# Patient Record
Sex: FEMALE | Race: WHITE | NOT HISPANIC OR LATINO | Employment: OTHER | URBAN - METROPOLITAN AREA
[De-identification: names, ages, dates, MRNs, and addresses within clinical notes are randomized per-mention and may not be internally consistent; named-entity substitution may affect disease eponyms.]

---

## 2022-11-09 ENCOUNTER — HOSPITAL ENCOUNTER (INPATIENT)
Facility: MEDICAL CENTER | Age: 67
LOS: 5 days | DRG: 481 | End: 2022-11-14
Attending: STUDENT IN AN ORGANIZED HEALTH CARE EDUCATION/TRAINING PROGRAM | Admitting: STUDENT IN AN ORGANIZED HEALTH CARE EDUCATION/TRAINING PROGRAM
Payer: MEDICARE

## 2022-11-09 ENCOUNTER — HOSPITAL ENCOUNTER (INPATIENT)
Facility: MEDICAL CENTER | Age: 67
LOS: 1 days | DRG: 481 | End: 2022-11-09
Attending: HOSPITALIST | Admitting: HOSPITALIST
Payer: MEDICARE

## 2022-11-09 ENCOUNTER — HOSPITAL ENCOUNTER (OUTPATIENT)
Dept: RADIOLOGY | Facility: MEDICAL CENTER | Age: 67
End: 2022-11-09

## 2022-11-09 VITALS
WEIGHT: 179.9 LBS | TEMPERATURE: 97.7 F | RESPIRATION RATE: 19 BRPM | BODY MASS INDEX: 29.94 KG/M2 | HEART RATE: 87 BPM | SYSTOLIC BLOOD PRESSURE: 108 MMHG | OXYGEN SATURATION: 92 % | DIASTOLIC BLOOD PRESSURE: 64 MMHG

## 2022-11-09 DIAGNOSIS — K70.30 ALCOHOLIC CIRRHOSIS, UNSPECIFIED WHETHER ASCITES PRESENT (HCC): ICD-10-CM

## 2022-11-09 DIAGNOSIS — S72.141A INTERTROCHANTERIC FRACTURE OF RIGHT HIP, CLOSED, INITIAL ENCOUNTER (HCC): ICD-10-CM

## 2022-11-09 PROBLEM — S72.001A HIP FRACTURE, RIGHT, CLOSED, INITIAL ENCOUNTER (HCC): Status: ACTIVE | Noted: 2022-11-09

## 2022-11-09 PROBLEM — F10.20 ALCOHOL DEPENDENCE (HCC): Status: ACTIVE | Noted: 2022-11-09
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
## 2022-11-09 PROBLEM — M25.551 RIGHT HIP PAIN: Status: ACTIVE | Noted: 2022-11-09

## 2022-11-09 PROBLEM — I10 HYPERTENSION: Status: ACTIVE | Noted: 2022-11-09

## 2022-11-09 PROCEDURE — A9270 NON-COVERED ITEM OR SERVICE: HCPCS | Performed by: STUDENT IN AN ORGANIZED HEALTH CARE EDUCATION/TRAINING PROGRAM

## 2022-11-09 PROCEDURE — 700101 HCHG RX REV CODE 250: Performed by: STUDENT IN AN ORGANIZED HEALTH CARE EDUCATION/TRAINING PROGRAM

## 2022-11-09 PROCEDURE — 700105 HCHG RX REV CODE 258: Performed by: STUDENT IN AN ORGANIZED HEALTH CARE EDUCATION/TRAINING PROGRAM

## 2022-11-09 PROCEDURE — 770001 HCHG ROOM/CARE - MED/SURG/GYN PRIV*

## 2022-11-09 PROCEDURE — 700111 HCHG RX REV CODE 636 W/ 250 OVERRIDE (IP): Performed by: STUDENT IN AN ORGANIZED HEALTH CARE EDUCATION/TRAINING PROGRAM

## 2022-11-09 PROCEDURE — 99222 1ST HOSP IP/OBS MODERATE 55: CPT | Mod: AI | Performed by: STUDENT IN AN ORGANIZED HEALTH CARE EDUCATION/TRAINING PROGRAM

## 2022-11-09 PROCEDURE — 99221 1ST HOSP IP/OBS SF/LOW 40: CPT | Performed by: STUDENT IN AN ORGANIZED HEALTH CARE EDUCATION/TRAINING PROGRAM

## 2022-11-09 PROCEDURE — 700102 HCHG RX REV CODE 250 W/ 637 OVERRIDE(OP): Performed by: STUDENT IN AN ORGANIZED HEALTH CARE EDUCATION/TRAINING PROGRAM

## 2022-11-09 RX ORDER — LIDOCAINE 50 MG/G
1 PATCH TOPICAL EVERY 24 HOURS
Status: DISCONTINUED | OUTPATIENT
Start: 2022-11-09 | End: 2022-11-09 | Stop reason: HOSPADM

## 2022-11-09 RX ORDER — LIDOCAINE 50 MG/G
1 PATCH TOPICAL EVERY 24 HOURS
Status: DISCONTINUED | OUTPATIENT
Start: 2022-11-09 | End: 2022-11-14 | Stop reason: HOSPADM

## 2022-11-09 RX ORDER — CELECOXIB 200 MG/1
200 CAPSULE ORAL 2 TIMES DAILY
Status: DISCONTINUED | OUTPATIENT
Start: 2022-11-09 | End: 2022-11-10

## 2022-11-09 RX ORDER — GUAIFENESIN/DEXTROMETHORPHAN 100-10MG/5
10 SYRUP ORAL EVERY 6 HOURS PRN
Status: DISCONTINUED | OUTPATIENT
Start: 2022-11-09 | End: 2022-11-09 | Stop reason: HOSPADM

## 2022-11-09 RX ORDER — PROCHLORPERAZINE EDISYLATE 5 MG/ML
5-10 INJECTION INTRAMUSCULAR; INTRAVENOUS EVERY 4 HOURS PRN
Status: DISCONTINUED | OUTPATIENT
Start: 2022-11-09 | End: 2022-11-09 | Stop reason: HOSPADM

## 2022-11-09 RX ORDER — AMOXICILLIN 250 MG
2 CAPSULE ORAL 2 TIMES DAILY
Status: DISCONTINUED | OUTPATIENT
Start: 2022-11-09 | End: 2022-11-10

## 2022-11-09 RX ORDER — CYCLOBENZAPRINE HCL 10 MG
10 TABLET ORAL 3 TIMES DAILY PRN
Status: DISCONTINUED | OUTPATIENT
Start: 2022-11-09 | End: 2022-11-14 | Stop reason: HOSPADM

## 2022-11-09 RX ORDER — ONDANSETRON 4 MG/1
4 TABLET, ORALLY DISINTEGRATING ORAL EVERY 4 HOURS PRN
Status: DISCONTINUED | OUTPATIENT
Start: 2022-11-09 | End: 2022-11-14 | Stop reason: HOSPADM

## 2022-11-09 RX ORDER — OXYCODONE HYDROCHLORIDE 5 MG/1
5 TABLET ORAL ONCE
Status: COMPLETED | OUTPATIENT
Start: 2022-11-09 | End: 2022-11-09

## 2022-11-09 RX ORDER — ACETAMINOPHEN 325 MG/1
650 TABLET ORAL EVERY 6 HOURS PRN
Status: DISCONTINUED | OUTPATIENT
Start: 2022-11-15 | End: 2022-11-10

## 2022-11-09 RX ORDER — ATENOLOL 50 MG/1
50 TABLET ORAL DAILY
Status: ON HOLD | COMMUNITY
End: 2022-11-14

## 2022-11-09 RX ORDER — OXYCODONE HYDROCHLORIDE 5 MG/1
5 TABLET ORAL
Status: DISCONTINUED | OUTPATIENT
Start: 2022-11-09 | End: 2022-11-09 | Stop reason: HOSPADM

## 2022-11-09 RX ORDER — OXYCODONE HYDROCHLORIDE 10 MG/1
10 TABLET ORAL
Status: DISCONTINUED | OUTPATIENT
Start: 2022-11-09 | End: 2022-11-09 | Stop reason: HOSPADM

## 2022-11-09 RX ORDER — LABETALOL HYDROCHLORIDE 5 MG/ML
10 INJECTION, SOLUTION INTRAVENOUS EVERY 4 HOURS PRN
Status: DISCONTINUED | OUTPATIENT
Start: 2022-11-09 | End: 2022-11-14 | Stop reason: HOSPADM

## 2022-11-09 RX ORDER — GUAIFENESIN/DEXTROMETHORPHAN 100-10MG/5
10 SYRUP ORAL EVERY 6 HOURS PRN
Status: DISCONTINUED | OUTPATIENT
Start: 2022-11-09 | End: 2022-11-14 | Stop reason: HOSPADM

## 2022-11-09 RX ORDER — SODIUM CHLORIDE 9 MG/ML
INJECTION, SOLUTION INTRAVENOUS CONTINUOUS
Status: DISCONTINUED | OUTPATIENT
Start: 2022-11-09 | End: 2022-11-09 | Stop reason: HOSPADM

## 2022-11-09 RX ORDER — ACETAMINOPHEN 325 MG/1
650 TABLET ORAL EVERY 6 HOURS
Status: DISCONTINUED | OUTPATIENT
Start: 2022-11-10 | End: 2022-11-10

## 2022-11-09 RX ORDER — OMEPRAZOLE 20 MG/1
20 CAPSULE, DELAYED RELEASE ORAL DAILY
Status: DISCONTINUED | OUTPATIENT
Start: 2022-11-10 | End: 2022-11-09 | Stop reason: HOSPADM

## 2022-11-09 RX ORDER — POLYETHYLENE GLYCOL 3350 17 G/17G
1 POWDER, FOR SOLUTION ORAL
Status: DISCONTINUED | OUTPATIENT
Start: 2022-11-09 | End: 2022-11-10

## 2022-11-09 RX ORDER — ENOXAPARIN SODIUM 100 MG/ML
40 INJECTION SUBCUTANEOUS DAILY
Status: DISCONTINUED | OUTPATIENT
Start: 2022-11-10 | End: 2022-11-10

## 2022-11-09 RX ORDER — OXYCODONE HYDROCHLORIDE 5 MG/1
5 TABLET ORAL
Status: DISCONTINUED | OUTPATIENT
Start: 2022-11-09 | End: 2022-11-10

## 2022-11-09 RX ORDER — AMOXICILLIN 250 MG
2 CAPSULE ORAL 2 TIMES DAILY
Status: DISCONTINUED | OUTPATIENT
Start: 2022-11-09 | End: 2022-11-09 | Stop reason: HOSPADM

## 2022-11-09 RX ORDER — FUROSEMIDE 40 MG/1
40 TABLET ORAL DAILY
Status: ON HOLD | COMMUNITY
End: 2022-11-14

## 2022-11-09 RX ORDER — ACETAMINOPHEN 325 MG/1
650 TABLET ORAL EVERY 6 HOURS PRN
Status: DISCONTINUED | OUTPATIENT
Start: 2022-11-09 | End: 2022-11-09

## 2022-11-09 RX ORDER — SODIUM CHLORIDE 9 MG/ML
INJECTION, SOLUTION INTRAVENOUS CONTINUOUS
Status: DISCONTINUED | OUTPATIENT
Start: 2022-11-09 | End: 2022-11-10

## 2022-11-09 RX ORDER — ACETAMINOPHEN 325 MG/1
650 TABLET ORAL EVERY 6 HOURS PRN
Status: DISCONTINUED | OUTPATIENT
Start: 2022-11-15 | End: 2022-11-09 | Stop reason: HOSPADM

## 2022-11-09 RX ORDER — PROMETHAZINE HYDROCHLORIDE 25 MG/1
12.5-25 TABLET ORAL EVERY 4 HOURS PRN
Status: DISCONTINUED | OUTPATIENT
Start: 2022-11-09 | End: 2022-11-09 | Stop reason: HOSPADM

## 2022-11-09 RX ORDER — OXYCODONE HYDROCHLORIDE 5 MG/1
5 TABLET ORAL ONCE
Status: DISCONTINUED | OUTPATIENT
Start: 2022-11-09 | End: 2022-11-09 | Stop reason: HOSPADM

## 2022-11-09 RX ORDER — POLYETHYLENE GLYCOL 3350 17 G/17G
1 POWDER, FOR SOLUTION ORAL
Status: DISCONTINUED | OUTPATIENT
Start: 2022-11-09 | End: 2022-11-09 | Stop reason: HOSPADM

## 2022-11-09 RX ORDER — ACETAMINOPHEN 325 MG/1
650 TABLET ORAL EVERY 6 HOURS
Status: DISCONTINUED | OUTPATIENT
Start: 2022-11-10 | End: 2022-11-09 | Stop reason: HOSPADM

## 2022-11-09 RX ORDER — FOLIC ACID 1 MG/1
1 TABLET ORAL DAILY
Status: DISCONTINUED | OUTPATIENT
Start: 2022-11-10 | End: 2022-11-09 | Stop reason: HOSPADM

## 2022-11-09 RX ORDER — ZOLPIDEM TARTRATE 5 MG/1
5 TABLET ORAL NIGHTLY PRN
Status: DISCONTINUED | OUTPATIENT
Start: 2022-11-09 | End: 2022-11-09 | Stop reason: HOSPADM

## 2022-11-09 RX ORDER — ZOLPIDEM TARTRATE 5 MG/1
5 TABLET ORAL NIGHTLY PRN
Status: DISCONTINUED | OUTPATIENT
Start: 2022-11-09 | End: 2022-11-14 | Stop reason: HOSPADM

## 2022-11-09 RX ORDER — CELECOXIB 200 MG/1
200 CAPSULE ORAL 2 TIMES DAILY
Status: DISCONTINUED | OUTPATIENT
Start: 2022-11-09 | End: 2022-11-09 | Stop reason: HOSPADM

## 2022-11-09 RX ORDER — BISACODYL 10 MG
10 SUPPOSITORY, RECTAL RECTAL
Status: DISCONTINUED | OUTPATIENT
Start: 2022-11-09 | End: 2022-11-09 | Stop reason: HOSPADM

## 2022-11-09 RX ORDER — OXYCODONE HYDROCHLORIDE 10 MG/1
10 TABLET ORAL
Status: DISCONTINUED | OUTPATIENT
Start: 2022-11-09 | End: 2022-11-10

## 2022-11-09 RX ORDER — HYDROMORPHONE HYDROCHLORIDE 1 MG/ML
0.5 INJECTION, SOLUTION INTRAMUSCULAR; INTRAVENOUS; SUBCUTANEOUS ONCE
Status: COMPLETED | OUTPATIENT
Start: 2022-11-09 | End: 2022-11-09

## 2022-11-09 RX ORDER — CELECOXIB 200 MG/1
200 CAPSULE ORAL 2 TIMES DAILY PRN
Status: DISCONTINUED | OUTPATIENT
Start: 2022-11-14 | End: 2022-11-10

## 2022-11-09 RX ORDER — ENOXAPARIN SODIUM 100 MG/ML
40 INJECTION SUBCUTANEOUS DAILY
Status: DISCONTINUED | OUTPATIENT
Start: 2022-11-10 | End: 2022-11-09 | Stop reason: HOSPADM

## 2022-11-09 RX ORDER — OMEPRAZOLE 20 MG/1
20 CAPSULE, DELAYED RELEASE ORAL DAILY
Status: DISCONTINUED | OUTPATIENT
Start: 2022-11-10 | End: 2022-11-14 | Stop reason: HOSPADM

## 2022-11-09 RX ORDER — HYDROMORPHONE HYDROCHLORIDE 1 MG/ML
0.5 INJECTION, SOLUTION INTRAMUSCULAR; INTRAVENOUS; SUBCUTANEOUS ONCE
Status: DISCONTINUED | OUTPATIENT
Start: 2022-11-09 | End: 2022-11-09 | Stop reason: HOSPADM

## 2022-11-09 RX ORDER — LABETALOL HYDROCHLORIDE 5 MG/ML
10 INJECTION, SOLUTION INTRAVENOUS EVERY 4 HOURS PRN
Status: DISCONTINUED | OUTPATIENT
Start: 2022-11-09 | End: 2022-11-09 | Stop reason: HOSPADM

## 2022-11-09 RX ORDER — CELECOXIB 200 MG/1
200 CAPSULE ORAL 2 TIMES DAILY PRN
Status: DISCONTINUED | OUTPATIENT
Start: 2022-11-14 | End: 2022-11-09 | Stop reason: HOSPADM

## 2022-11-09 RX ORDER — ONDANSETRON 4 MG/1
4 TABLET, ORALLY DISINTEGRATING ORAL EVERY 4 HOURS PRN
Status: DISCONTINUED | OUTPATIENT
Start: 2022-11-09 | End: 2022-11-09 | Stop reason: HOSPADM

## 2022-11-09 RX ORDER — ONDANSETRON 2 MG/ML
4 INJECTION INTRAMUSCULAR; INTRAVENOUS EVERY 4 HOURS PRN
Status: DISCONTINUED | OUTPATIENT
Start: 2022-11-09 | End: 2022-11-09 | Stop reason: HOSPADM

## 2022-11-09 RX ORDER — ONDANSETRON 2 MG/ML
4 INJECTION INTRAMUSCULAR; INTRAVENOUS EVERY 4 HOURS PRN
Status: DISCONTINUED | OUTPATIENT
Start: 2022-11-09 | End: 2022-11-10

## 2022-11-09 RX ORDER — BISACODYL 10 MG
10 SUPPOSITORY, RECTAL RECTAL
Status: DISCONTINUED | OUTPATIENT
Start: 2022-11-09 | End: 2022-11-10

## 2022-11-09 RX ORDER — GAUZE BANDAGE 2" X 2"
100 BANDAGE TOPICAL DAILY
Status: DISCONTINUED | OUTPATIENT
Start: 2022-11-10 | End: 2022-11-09 | Stop reason: HOSPADM

## 2022-11-09 RX ORDER — PROMETHAZINE HYDROCHLORIDE 25 MG/1
12.5-25 SUPPOSITORY RECTAL EVERY 4 HOURS PRN
Status: DISCONTINUED | OUTPATIENT
Start: 2022-11-09 | End: 2022-11-09 | Stop reason: HOSPADM

## 2022-11-09 RX ORDER — HYDROMORPHONE HYDROCHLORIDE 1 MG/ML
0.5 INJECTION, SOLUTION INTRAMUSCULAR; INTRAVENOUS; SUBCUTANEOUS
Status: DISCONTINUED | OUTPATIENT
Start: 2022-11-09 | End: 2022-11-10

## 2022-11-09 RX ORDER — SPIRONOLACTONE 100 MG/1
100 TABLET, FILM COATED ORAL DAILY
Status: ON HOLD | COMMUNITY
End: 2022-11-14

## 2022-11-09 RX ORDER — HYDROMORPHONE HYDROCHLORIDE 1 MG/ML
0.5 INJECTION, SOLUTION INTRAMUSCULAR; INTRAVENOUS; SUBCUTANEOUS
Status: DISCONTINUED | OUTPATIENT
Start: 2022-11-09 | End: 2022-11-09 | Stop reason: HOSPADM

## 2022-11-09 RX ADMIN — CYCLOBENZAPRINE 10 MG: 10 TABLET, FILM COATED ORAL at 23:44

## 2022-11-09 RX ADMIN — SODIUM CHLORIDE: 9 INJECTION, SOLUTION INTRAVENOUS at 23:29

## 2022-11-09 RX ADMIN — OXYCODONE 5 MG: 5 TABLET ORAL at 23:04

## 2022-11-09 RX ADMIN — HYDROMORPHONE HYDROCHLORIDE 0.5 MG: 1 INJECTION, SOLUTION INTRAMUSCULAR; INTRAVENOUS; SUBCUTANEOUS at 23:04

## 2022-11-09 RX ADMIN — CELECOXIB 200 MG: 200 CAPSULE ORAL at 23:04

## 2022-11-09 RX ADMIN — ACETAMINOPHEN 650 MG: 325 TABLET, FILM COATED ORAL at 23:44

## 2022-11-09 RX ADMIN — SENNOSIDES AND DOCUSATE SODIUM 2 TABLET: 50; 8.6 TABLET ORAL at 23:04

## 2022-11-09 RX ADMIN — LIDOCAINE 1 PATCH: 50 PATCH CUTANEOUS at 23:05

## 2022-11-09 ASSESSMENT — PAIN DESCRIPTION - PAIN TYPE: TYPE: ACUTE PAIN

## 2022-11-10 ENCOUNTER — APPOINTMENT (OUTPATIENT)
Dept: RADIOLOGY | Facility: MEDICAL CENTER | Age: 67
DRG: 481 | End: 2022-11-10
Attending: ORTHOPAEDIC SURGERY
Payer: MEDICARE

## 2022-11-10 ENCOUNTER — ANESTHESIA EVENT (OUTPATIENT)
Dept: SURGERY | Facility: MEDICAL CENTER | Age: 67
DRG: 481 | End: 2022-11-10
Payer: MEDICARE

## 2022-11-10 ENCOUNTER — ANESTHESIA (OUTPATIENT)
Dept: SURGERY | Facility: MEDICAL CENTER | Age: 67
DRG: 481 | End: 2022-11-10
Payer: MEDICARE

## 2022-11-10 LAB
ABO + RH BLD: NORMAL
ABO GROUP BLD: NORMAL
ALBUMIN SERPL BCP-MCNC: 3.6 G/DL (ref 3.2–4.9)
ALBUMIN/GLOB SERPL: 1 G/DL
ALP SERPL-CCNC: 124 U/L (ref 30–99)
ALT SERPL-CCNC: 24 U/L (ref 2–50)
ANION GAP SERPL CALC-SCNC: 12 MMOL/L (ref 7–16)
AST SERPL-CCNC: 55 U/L (ref 12–45)
BASOPHILS # BLD AUTO: 0.8 % (ref 0–1.8)
BASOPHILS # BLD: 0.04 K/UL (ref 0–0.12)
BILIRUB SERPL-MCNC: 3.3 MG/DL (ref 0.1–1.5)
BLD GP AB SCN SERPL QL: NORMAL
BUN SERPL-MCNC: 33 MG/DL (ref 8–22)
CALCIUM SERPL-MCNC: 9.4 MG/DL (ref 8.5–10.5)
CHLORIDE SERPL-SCNC: 101 MMOL/L (ref 96–112)
CO2 SERPL-SCNC: 21 MMOL/L (ref 20–33)
CREAT SERPL-MCNC: 1.19 MG/DL (ref 0.5–1.4)
EKG IMPRESSION: NORMAL
EOSINOPHIL # BLD AUTO: 0.03 K/UL (ref 0–0.51)
EOSINOPHIL NFR BLD: 0.6 % (ref 0–6.9)
ERYTHROCYTE [DISTWIDTH] IN BLOOD BY AUTOMATED COUNT: 50.4 FL (ref 35.9–50)
GFR SERPLBLD CREATININE-BSD FMLA CKD-EPI: 50 ML/MIN/1.73 M 2
GLOBULIN SER CALC-MCNC: 3.7 G/DL (ref 1.9–3.5)
GLUCOSE SERPL-MCNC: 119 MG/DL (ref 65–99)
HCT VFR BLD AUTO: 36.8 % (ref 37–47)
HGB BLD-MCNC: 12.7 G/DL (ref 12–16)
IMM GRANULOCYTES # BLD AUTO: 0.04 K/UL (ref 0–0.11)
IMM GRANULOCYTES NFR BLD AUTO: 0.8 % (ref 0–0.9)
INR PPP: 1.08 (ref 0.87–1.13)
LYMPHOCYTES # BLD AUTO: 0.53 K/UL (ref 1–4.8)
LYMPHOCYTES NFR BLD: 10.5 % (ref 22–41)
MAGNESIUM SERPL-MCNC: 1.4 MG/DL (ref 1.5–2.5)
MCH RBC QN AUTO: 37.2 PG (ref 27–33)
MCHC RBC AUTO-ENTMCNC: 34.5 G/DL (ref 33.6–35)
MCV RBC AUTO: 107.9 FL (ref 81.4–97.8)
MONOCYTES # BLD AUTO: 0.8 K/UL (ref 0–0.85)
MONOCYTES NFR BLD AUTO: 15.8 % (ref 0–13.4)
NEUTROPHILS # BLD AUTO: 3.61 K/UL (ref 2–7.15)
NEUTROPHILS NFR BLD: 71.5 % (ref 44–72)
NRBC # BLD AUTO: 0 K/UL
NRBC BLD-RTO: 0 /100 WBC
PHOSPHATE SERPL-MCNC: 3.8 MG/DL (ref 2.5–4.5)
PLATELET # BLD AUTO: 61 K/UL (ref 164–446)
PMV BLD AUTO: 10.2 FL (ref 9–12.9)
POTASSIUM SERPL-SCNC: 5 MMOL/L (ref 3.6–5.5)
PROT SERPL-MCNC: 7.3 G/DL (ref 6–8.2)
PROTHROMBIN TIME: 13.9 SEC (ref 12–14.6)
RBC # BLD AUTO: 3.41 M/UL (ref 4.2–5.4)
RH BLD: NORMAL
SODIUM SERPL-SCNC: 134 MMOL/L (ref 135–145)
WBC # BLD AUTO: 5.1 K/UL (ref 4.8–10.8)

## 2022-11-10 PROCEDURE — 700105 HCHG RX REV CODE 258: Performed by: INTERNAL MEDICINE

## 2022-11-10 PROCEDURE — 700111 HCHG RX REV CODE 636 W/ 250 OVERRIDE (IP): Performed by: ORTHOPAEDIC SURGERY

## 2022-11-10 PROCEDURE — 700101 HCHG RX REV CODE 250: Performed by: STUDENT IN AN ORGANIZED HEALTH CARE EDUCATION/TRAINING PROGRAM

## 2022-11-10 PROCEDURE — 160048 HCHG OR STATISTICAL LEVEL 1-5: Performed by: ORTHOPAEDIC SURGERY

## 2022-11-10 PROCEDURE — 160035 HCHG PACU - 1ST 60 MINS PHASE I: Performed by: ORTHOPAEDIC SURGERY

## 2022-11-10 PROCEDURE — 160029 HCHG SURGERY MINUTES - 1ST 30 MINS LEVEL 4: Performed by: ORTHOPAEDIC SURGERY

## 2022-11-10 PROCEDURE — A9270 NON-COVERED ITEM OR SERVICE: HCPCS | Performed by: STUDENT IN AN ORGANIZED HEALTH CARE EDUCATION/TRAINING PROGRAM

## 2022-11-10 PROCEDURE — 83735 ASSAY OF MAGNESIUM: CPT

## 2022-11-10 PROCEDURE — 85610 PROTHROMBIN TIME: CPT

## 2022-11-10 PROCEDURE — 700105 HCHG RX REV CODE 258: Performed by: STUDENT IN AN ORGANIZED HEALTH CARE EDUCATION/TRAINING PROGRAM

## 2022-11-10 PROCEDURE — C1713 ANCHOR/SCREW BN/BN,TIS/BN: HCPCS | Performed by: ORTHOPAEDIC SURGERY

## 2022-11-10 PROCEDURE — 86900 BLOOD TYPING SEROLOGIC ABO: CPT

## 2022-11-10 PROCEDURE — 27245 TREAT THIGH FRACTURE: CPT | Mod: RT | Performed by: ORTHOPAEDIC SURGERY

## 2022-11-10 PROCEDURE — 160002 HCHG RECOVERY MINUTES (STAT): Performed by: ORTHOPAEDIC SURGERY

## 2022-11-10 PROCEDURE — A9270 NON-COVERED ITEM OR SERVICE: HCPCS | Performed by: ORTHOPAEDIC SURGERY

## 2022-11-10 PROCEDURE — 80053 COMPREHEN METABOLIC PANEL: CPT

## 2022-11-10 PROCEDURE — 86850 RBC ANTIBODY SCREEN: CPT

## 2022-11-10 PROCEDURE — 86901 BLOOD TYPING SEROLOGIC RH(D): CPT

## 2022-11-10 PROCEDURE — 99223 1ST HOSP IP/OBS HIGH 75: CPT | Mod: 57 | Performed by: ORTHOPAEDIC SURGERY

## 2022-11-10 PROCEDURE — 700105 HCHG RX REV CODE 258: Performed by: ORTHOPAEDIC SURGERY

## 2022-11-10 PROCEDURE — 93005 ELECTROCARDIOGRAM TRACING: CPT | Performed by: STUDENT IN AN ORGANIZED HEALTH CARE EDUCATION/TRAINING PROGRAM

## 2022-11-10 PROCEDURE — 99232 SBSQ HOSP IP/OBS MODERATE 35: CPT | Performed by: INTERNAL MEDICINE

## 2022-11-10 PROCEDURE — 770001 HCHG ROOM/CARE - MED/SURG/GYN PRIV*

## 2022-11-10 PROCEDURE — 01210 ANES OPEN PX HIP JOINT NOS: CPT | Performed by: STUDENT IN AN ORGANIZED HEALTH CARE EDUCATION/TRAINING PROGRAM

## 2022-11-10 PROCEDURE — 160041 HCHG SURGERY MINUTES - EA ADDL 1 MIN LEVEL 4: Performed by: ORTHOPAEDIC SURGERY

## 2022-11-10 PROCEDURE — 93010 ELECTROCARDIOGRAM REPORT: CPT | Performed by: INTERNAL MEDICINE

## 2022-11-10 PROCEDURE — 700102 HCHG RX REV CODE 250 W/ 637 OVERRIDE(OP): Performed by: ORTHOPAEDIC SURGERY

## 2022-11-10 PROCEDURE — 160009 HCHG ANES TIME/MIN: Performed by: ORTHOPAEDIC SURGERY

## 2022-11-10 PROCEDURE — 0QS636Z REPOSITION RIGHT UPPER FEMUR WITH INTRAMEDULLARY INTERNAL FIXATION DEVICE, PERCUTANEOUS APPROACH: ICD-10-PCS | Performed by: ORTHOPAEDIC SURGERY

## 2022-11-10 PROCEDURE — 36415 COLL VENOUS BLD VENIPUNCTURE: CPT

## 2022-11-10 PROCEDURE — 73502 X-RAY EXAM HIP UNI 2-3 VIEWS: CPT | Mod: RT

## 2022-11-10 PROCEDURE — 700111 HCHG RX REV CODE 636 W/ 250 OVERRIDE (IP): Performed by: STUDENT IN AN ORGANIZED HEALTH CARE EDUCATION/TRAINING PROGRAM

## 2022-11-10 PROCEDURE — 700102 HCHG RX REV CODE 250 W/ 637 OVERRIDE(OP): Performed by: STUDENT IN AN ORGANIZED HEALTH CARE EDUCATION/TRAINING PROGRAM

## 2022-11-10 PROCEDURE — 85025 COMPLETE CBC W/AUTO DIFF WBC: CPT

## 2022-11-10 PROCEDURE — 84100 ASSAY OF PHOSPHORUS: CPT

## 2022-11-10 DEVICE — SCREW LAG 10.5MM X 95MM (4TX2=8): Type: IMPLANTABLE DEVICE | Site: HIP | Status: FUNCTIONAL

## 2022-11-10 DEVICE — NAIL HIP 127.5 DEGREE 10MM X 180MM (4TX2=8): Type: IMPLANTABLE DEVICE | Site: HIP | Status: FUNCTIONAL

## 2022-11-10 DEVICE — SCREW CROSS LOCK 5MM X 32.5MM (4TX5=20): Type: IMPLANTABLE DEVICE | Site: HIP | Status: FUNCTIONAL

## 2022-11-10 RX ORDER — DOCUSATE SODIUM 100 MG/1
100 CAPSULE, LIQUID FILLED ORAL 2 TIMES DAILY
Status: DISCONTINUED | OUTPATIENT
Start: 2022-11-10 | End: 2022-11-14 | Stop reason: HOSPADM

## 2022-11-10 RX ORDER — SODIUM CHLORIDE, SODIUM LACTATE, POTASSIUM CHLORIDE, CALCIUM CHLORIDE 600; 310; 30; 20 MG/100ML; MG/100ML; MG/100ML; MG/100ML
INJECTION, SOLUTION INTRAVENOUS CONTINUOUS
Status: DISCONTINUED | OUTPATIENT
Start: 2022-11-10 | End: 2022-11-10 | Stop reason: HOSPADM

## 2022-11-10 RX ORDER — POLYETHYLENE GLYCOL 3350 17 G/17G
1 POWDER, FOR SOLUTION ORAL 2 TIMES DAILY PRN
Status: DISCONTINUED | OUTPATIENT
Start: 2022-11-10 | End: 2022-11-14 | Stop reason: HOSPADM

## 2022-11-10 RX ORDER — HALOPERIDOL 5 MG/ML
1 INJECTION INTRAMUSCULAR
Status: DISCONTINUED | OUTPATIENT
Start: 2022-11-10 | End: 2022-11-10 | Stop reason: HOSPADM

## 2022-11-10 RX ORDER — ACETAMINOPHEN 325 MG/1
650 TABLET ORAL EVERY 6 HOURS
Status: DISCONTINUED | OUTPATIENT
Start: 2022-11-10 | End: 2022-11-14 | Stop reason: HOSPADM

## 2022-11-10 RX ORDER — PHENYLEPHRINE HCL IN 0.9% NACL 0.5 MG/5ML
SYRINGE (ML) INTRAVENOUS PRN
Status: DISCONTINUED | OUTPATIENT
Start: 2022-11-10 | End: 2022-11-10 | Stop reason: SURG

## 2022-11-10 RX ORDER — DIPHENHYDRAMINE HYDROCHLORIDE 50 MG/ML
25 INJECTION INTRAMUSCULAR; INTRAVENOUS EVERY 6 HOURS PRN
Status: DISCONTINUED | OUTPATIENT
Start: 2022-11-10 | End: 2022-11-14 | Stop reason: HOSPADM

## 2022-11-10 RX ORDER — ACETAMINOPHEN 325 MG/1
650 TABLET ORAL EVERY 6 HOURS PRN
Status: DISCONTINUED | OUTPATIENT
Start: 2022-11-15 | End: 2022-11-14 | Stop reason: HOSPADM

## 2022-11-10 RX ORDER — OXYCODONE HYDROCHLORIDE 5 MG/1
5 TABLET ORAL
Status: DISCONTINUED | OUTPATIENT
Start: 2022-11-10 | End: 2022-11-14 | Stop reason: HOSPADM

## 2022-11-10 RX ORDER — IBUPROFEN 800 MG/1
800 TABLET ORAL 3 TIMES DAILY PRN
Status: DISCONTINUED | OUTPATIENT
Start: 2022-11-13 | End: 2022-11-11

## 2022-11-10 RX ORDER — ONDANSETRON 2 MG/ML
INJECTION INTRAMUSCULAR; INTRAVENOUS PRN
Status: DISCONTINUED | OUTPATIENT
Start: 2022-11-10 | End: 2022-11-10 | Stop reason: SURG

## 2022-11-10 RX ORDER — OXYCODONE HYDROCHLORIDE 10 MG/1
10 TABLET ORAL
Status: DISCONTINUED | OUTPATIENT
Start: 2022-11-10 | End: 2022-11-14 | Stop reason: HOSPADM

## 2022-11-10 RX ORDER — HYDROMORPHONE HYDROCHLORIDE 1 MG/ML
0.4 INJECTION, SOLUTION INTRAMUSCULAR; INTRAVENOUS; SUBCUTANEOUS
Status: DISCONTINUED | OUTPATIENT
Start: 2022-11-10 | End: 2022-11-10 | Stop reason: HOSPADM

## 2022-11-10 RX ORDER — SODIUM CHLORIDE, SODIUM LACTATE, POTASSIUM CHLORIDE, CALCIUM CHLORIDE 600; 310; 30; 20 MG/100ML; MG/100ML; MG/100ML; MG/100ML
INJECTION, SOLUTION INTRAVENOUS
Status: DISCONTINUED | OUTPATIENT
Start: 2022-11-10 | End: 2022-11-10 | Stop reason: SURG

## 2022-11-10 RX ORDER — MIDAZOLAM HYDROCHLORIDE 1 MG/ML
INJECTION INTRAMUSCULAR; INTRAVENOUS PRN
Status: DISCONTINUED | OUTPATIENT
Start: 2022-11-10 | End: 2022-11-10 | Stop reason: SURG

## 2022-11-10 RX ORDER — ENOXAPARIN SODIUM 100 MG/ML
40 INJECTION SUBCUTANEOUS
Status: DISCONTINUED | OUTPATIENT
Start: 2022-11-10 | End: 2022-11-14 | Stop reason: HOSPADM

## 2022-11-10 RX ORDER — SCOLOPAMINE TRANSDERMAL SYSTEM 1 MG/1
1 PATCH, EXTENDED RELEASE TRANSDERMAL
Status: DISCONTINUED | OUTPATIENT
Start: 2022-11-10 | End: 2022-11-14 | Stop reason: HOSPADM

## 2022-11-10 RX ORDER — DEXAMETHASONE SODIUM PHOSPHATE 4 MG/ML
INJECTION, SOLUTION INTRA-ARTICULAR; INTRALESIONAL; INTRAMUSCULAR; INTRAVENOUS; SOFT TISSUE PRN
Status: DISCONTINUED | OUTPATIENT
Start: 2022-11-10 | End: 2022-11-10 | Stop reason: SURG

## 2022-11-10 RX ORDER — HALOPERIDOL 5 MG/ML
1 INJECTION INTRAMUSCULAR EVERY 6 HOURS PRN
Status: DISCONTINUED | OUTPATIENT
Start: 2022-11-10 | End: 2022-11-14 | Stop reason: HOSPADM

## 2022-11-10 RX ORDER — ONDANSETRON 2 MG/ML
4 INJECTION INTRAMUSCULAR; INTRAVENOUS EVERY 4 HOURS PRN
Status: DISCONTINUED | OUTPATIENT
Start: 2022-11-10 | End: 2022-11-14 | Stop reason: HOSPADM

## 2022-11-10 RX ORDER — HYDROMORPHONE HYDROCHLORIDE 1 MG/ML
0.1 INJECTION, SOLUTION INTRAMUSCULAR; INTRAVENOUS; SUBCUTANEOUS
Status: DISCONTINUED | OUTPATIENT
Start: 2022-11-10 | End: 2022-11-10 | Stop reason: HOSPADM

## 2022-11-10 RX ORDER — CEFAZOLIN SODIUM 1 G/3ML
INJECTION, POWDER, FOR SOLUTION INTRAMUSCULAR; INTRAVENOUS PRN
Status: DISCONTINUED | OUTPATIENT
Start: 2022-11-10 | End: 2022-11-10 | Stop reason: SURG

## 2022-11-10 RX ORDER — HYDRALAZINE HYDROCHLORIDE 20 MG/ML
5 INJECTION INTRAMUSCULAR; INTRAVENOUS
Status: DISCONTINUED | OUTPATIENT
Start: 2022-11-10 | End: 2022-11-10 | Stop reason: HOSPADM

## 2022-11-10 RX ORDER — BISACODYL 10 MG
10 SUPPOSITORY, RECTAL RECTAL
Status: DISCONTINUED | OUTPATIENT
Start: 2022-11-10 | End: 2022-11-14 | Stop reason: HOSPADM

## 2022-11-10 RX ORDER — DIPHENHYDRAMINE HYDROCHLORIDE 50 MG/ML
12.5 INJECTION INTRAMUSCULAR; INTRAVENOUS
Status: DISCONTINUED | OUTPATIENT
Start: 2022-11-10 | End: 2022-11-10 | Stop reason: HOSPADM

## 2022-11-10 RX ORDER — ROCURONIUM BROMIDE 10 MG/ML
INJECTION, SOLUTION INTRAVENOUS PRN
Status: DISCONTINUED | OUTPATIENT
Start: 2022-11-10 | End: 2022-11-10 | Stop reason: SURG

## 2022-11-10 RX ORDER — ONDANSETRON 2 MG/ML
4 INJECTION INTRAMUSCULAR; INTRAVENOUS
Status: DISCONTINUED | OUTPATIENT
Start: 2022-11-10 | End: 2022-11-10 | Stop reason: HOSPADM

## 2022-11-10 RX ORDER — DEXAMETHASONE SODIUM PHOSPHATE 4 MG/ML
4 INJECTION, SOLUTION INTRA-ARTICULAR; INTRALESIONAL; INTRAMUSCULAR; INTRAVENOUS; SOFT TISSUE
Status: DISCONTINUED | OUTPATIENT
Start: 2022-11-10 | End: 2022-11-14 | Stop reason: HOSPADM

## 2022-11-10 RX ORDER — AMOXICILLIN 250 MG
1 CAPSULE ORAL
Status: DISCONTINUED | OUTPATIENT
Start: 2022-11-10 | End: 2022-11-14 | Stop reason: HOSPADM

## 2022-11-10 RX ORDER — HYDROMORPHONE HYDROCHLORIDE 1 MG/ML
0.2 INJECTION, SOLUTION INTRAMUSCULAR; INTRAVENOUS; SUBCUTANEOUS
Status: DISCONTINUED | OUTPATIENT
Start: 2022-11-10 | End: 2022-11-10 | Stop reason: HOSPADM

## 2022-11-10 RX ORDER — HYDROMORPHONE HYDROCHLORIDE 1 MG/ML
0.5 INJECTION, SOLUTION INTRAMUSCULAR; INTRAVENOUS; SUBCUTANEOUS
Status: DISCONTINUED | OUTPATIENT
Start: 2022-11-10 | End: 2022-11-14 | Stop reason: HOSPADM

## 2022-11-10 RX ORDER — AMOXICILLIN 250 MG
1 CAPSULE ORAL NIGHTLY
Status: DISCONTINUED | OUTPATIENT
Start: 2022-11-10 | End: 2022-11-14 | Stop reason: HOSPADM

## 2022-11-10 RX ORDER — ENEMA 19; 7 G/133ML; G/133ML
1 ENEMA RECTAL
Status: DISCONTINUED | OUTPATIENT
Start: 2022-11-10 | End: 2022-11-14 | Stop reason: HOSPADM

## 2022-11-10 RX ORDER — LABETALOL HYDROCHLORIDE 5 MG/ML
5 INJECTION, SOLUTION INTRAVENOUS
Status: DISCONTINUED | OUTPATIENT
Start: 2022-11-10 | End: 2022-11-10 | Stop reason: HOSPADM

## 2022-11-10 RX ORDER — OXYCODONE HCL 5 MG/5 ML
5 SOLUTION, ORAL ORAL
Status: COMPLETED | OUTPATIENT
Start: 2022-11-10 | End: 2022-11-10

## 2022-11-10 RX ORDER — SODIUM CHLORIDE 9 MG/ML
INJECTION, SOLUTION INTRAVENOUS CONTINUOUS
Status: DISCONTINUED | OUTPATIENT
Start: 2022-11-10 | End: 2022-11-11

## 2022-11-10 RX ORDER — LIDOCAINE HYDROCHLORIDE 40 MG/ML
SOLUTION TOPICAL PRN
Status: DISCONTINUED | OUTPATIENT
Start: 2022-11-10 | End: 2022-11-10 | Stop reason: SURG

## 2022-11-10 RX ORDER — KETOROLAC TROMETHAMINE 30 MG/ML
15 INJECTION, SOLUTION INTRAMUSCULAR; INTRAVENOUS EVERY 6 HOURS
Status: DISCONTINUED | OUTPATIENT
Start: 2022-11-10 | End: 2022-11-11

## 2022-11-10 RX ORDER — OXYCODONE HCL 5 MG/5 ML
10 SOLUTION, ORAL ORAL
Status: COMPLETED | OUTPATIENT
Start: 2022-11-10 | End: 2022-11-10

## 2022-11-10 RX ADMIN — ACETAMINOPHEN 650 MG: 325 TABLET, FILM COATED ORAL at 16:10

## 2022-11-10 RX ADMIN — KETOROLAC TROMETHAMINE 15 MG: 30 INJECTION, SOLUTION INTRAMUSCULAR; INTRAVENOUS at 14:26

## 2022-11-10 RX ADMIN — DEXAMETHASONE SODIUM PHOSPHATE 8 MG: 4 INJECTION, SOLUTION INTRA-ARTICULAR; INTRALESIONAL; INTRAMUSCULAR; INTRAVENOUS; SOFT TISSUE at 07:25

## 2022-11-10 RX ADMIN — CEFAZOLIN 2 G: 2 INJECTION, POWDER, FOR SOLUTION INTRAMUSCULAR; INTRAVENOUS at 11:39

## 2022-11-10 RX ADMIN — FENTANYL CITRATE 50 MCG: 50 INJECTION, SOLUTION INTRAMUSCULAR; INTRAVENOUS at 07:16

## 2022-11-10 RX ADMIN — SUGAMMADEX 200 MG: 100 INJECTION, SOLUTION INTRAVENOUS at 07:38

## 2022-11-10 RX ADMIN — DOCUSATE SODIUM 100 MG: 100 CAPSULE, LIQUID FILLED ORAL at 17:30

## 2022-11-10 RX ADMIN — SODIUM CHLORIDE 1000 ML: 9 INJECTION, SOLUTION INTRAVENOUS at 23:21

## 2022-11-10 RX ADMIN — LIDOCAINE 1 PATCH: 50 PATCH CUTANEOUS at 23:15

## 2022-11-10 RX ADMIN — SODIUM CHLORIDE: 9 INJECTION, SOLUTION INTRAVENOUS at 13:36

## 2022-11-10 RX ADMIN — Medication 200 MCG: at 07:22

## 2022-11-10 RX ADMIN — SODIUM CHLORIDE, POTASSIUM CHLORIDE, SODIUM LACTATE AND CALCIUM CHLORIDE: 600; 310; 30; 20 INJECTION, SOLUTION INTRAVENOUS at 07:11

## 2022-11-10 RX ADMIN — OXYCODONE HYDROCHLORIDE 5 MG: 5 SOLUTION ORAL at 08:08

## 2022-11-10 RX ADMIN — OXYCODONE 5 MG: 5 TABLET ORAL at 16:10

## 2022-11-10 RX ADMIN — CEFAZOLIN 2 G: 330 INJECTION, POWDER, FOR SOLUTION INTRAMUSCULAR; INTRAVENOUS at 07:16

## 2022-11-10 RX ADMIN — SENNOSIDES AND DOCUSATE SODIUM 1 TABLET: 50; 8.6 TABLET ORAL at 21:13

## 2022-11-10 RX ADMIN — ENOXAPARIN SODIUM 40 MG: 40 INJECTION SUBCUTANEOUS at 21:13

## 2022-11-10 RX ADMIN — ROCURONIUM BROMIDE 40 MG: 10 INJECTION, SOLUTION INTRAVENOUS at 07:17

## 2022-11-10 RX ADMIN — ACETAMINOPHEN 650 MG: 325 TABLET, FILM COATED ORAL at 23:12

## 2022-11-10 RX ADMIN — PROPOFOL 60 MG: 10 INJECTION, EMULSION INTRAVENOUS at 07:16

## 2022-11-10 RX ADMIN — ACETAMINOPHEN 650 MG: 325 TABLET, FILM COATED ORAL at 11:22

## 2022-11-10 RX ADMIN — KETOROLAC TROMETHAMINE 15 MG: 30 INJECTION, SOLUTION INTRAMUSCULAR; INTRAVENOUS at 09:52

## 2022-11-10 RX ADMIN — EPHEDRINE SULFATE 10 MG: 50 INJECTION, SOLUTION INTRAVENOUS at 07:27

## 2022-11-10 RX ADMIN — MIDAZOLAM HYDROCHLORIDE 2 MG: 1 INJECTION, SOLUTION INTRAMUSCULAR; INTRAVENOUS at 07:05

## 2022-11-10 RX ADMIN — KETOROLAC TROMETHAMINE 15 MG: 30 INJECTION, SOLUTION INTRAMUSCULAR; INTRAVENOUS at 21:13

## 2022-11-10 RX ADMIN — ONDANSETRON 4 MG: 2 INJECTION INTRAMUSCULAR; INTRAVENOUS at 07:38

## 2022-11-10 RX ADMIN — LIDOCAINE HYDROCHLORIDE 4 ML: 40 SOLUTION TOPICAL at 07:17

## 2022-11-10 ASSESSMENT — PAIN DESCRIPTION - PAIN TYPE
TYPE: SURGICAL PAIN
TYPE: SURGICAL PAIN

## 2022-11-10 ASSESSMENT — PAIN SCALES - GENERAL: PAIN_LEVEL: 2

## 2022-11-10 NOTE — HOSPITAL COURSE
This is a 61 y.o. female with history of hypertension who presented 11/9/2022 to [facility] ED with fall, found to have right hip fracture on x-ray, transferred to [facility] as direct admit for orthopedic evaluation.  Patient reported having mechanical fall earlier today as she was trying to help her  who is dependent on home oxygen due to COPD.  She reported having slipped/tripped and fell on her right side, followed by immediate pain in her right hip and unable to ambulate. Patient found to have a right hip fracture. Ortho consulted and patient admitted for further treatment     This is a 61-year-old female with a past medical significant for hypertension, ethanol abuse, complicated with liver cirrhosis presented in [facility] on 11/9/2022 after she had a ground-level fall.  Imaging showed right hip fracture, transferred to [facility] for orthopedic evaluation    Orthopedic surgery  was consulted, patient underwent Surgical treatment of right intertrochanteric femur fracture with intramedullary device on 11/10/2022.   Per orthopedic surgery, patient can bear weight as tolerated. PT/OT has evaluated patient, recommended home with home health.  Hospital course was complicated with pancytopenia likely secondary to toxic effect of ethanol abuse.  Patient is noted to have thrombocytopenia likely secondary to liver cirrhosis.  Adverse effect of drinking alcohol has been explained to the patient today, she stated understanding.    During this stay in the hospital, patient did not tell me how much alcohol does she drink,  she was started on CIWA protocol, she will be discharged on p.o. thiamine folic acid.    I discussed with the patient that I am not able to give her any DVT prophylaxis he needs to walk at home.  I also explained to her that the risk of DVT considering she is not on any DVT prophylaxis considering patient severe thrombocytopenia

## 2022-11-10 NOTE — OR NURSING
PACU note- Respirations easy.  Three tegaderms and gauze in place to right lateral upper leg, blood on underneath layer of top and bottom dressings, has not increased during PACU stay.  Ice pack in place.  Palpable pedal pulses, patient can wiggle her toes, they are pink with brisk capillary refill.  Patient very sleepy, arouses easily, oral med given.  Report to floor and wife updated.

## 2022-11-10 NOTE — CARE PLAN
Problem: Knowledge Deficit - Standard  Goal: Patient and family/care givers will demonstrate understanding of plan of care, disease process/condition, diagnostic tests and medications  Outcome: Progressing  Note: POC discussed with pt at bedside. Pt asks appropriate questions, no additional concerns at this time.      Problem: Pain - Standard  Goal: Alleviation of pain or a reduction in pain to the patient’s comfort goal  Outcome: Progressing  Note: Pt states pain 10/10. Medications administered per MAR, ice pack applied, pillows for comfort and repositioning.    The patient is Watcher - Medium risk of patient condition declining or worsening    Shift Goals  Clinical Goals: surgery, pain control, NPO midnight  Patient Goals: pain control  Family Goals: communication, pain control    Progress made toward(s) clinical / shift goals:        Patient is not progressing towards the following goals:

## 2022-11-10 NOTE — PROGRESS NOTES
[facility] Medicine Daily Progress Note    Date of Service  11/10/2022    Chief Complaint  Rosalva Hunter is a 66 y.o. female admitted 11/9/2022 with right hip pain after GLF     Hospital Course  This is a 61 y.o. female with history of hypertension who presented 11/9/2022 to [facility] ED with fall, found to have right hip fracture on x-ray, transferred to [facility] as direct admit for orthopedic evaluation.  Patient reported having mechanical fall earlier today as she was trying to help her  who is dependent on home oxygen due to COPD.  She reported having slipped/tripped and fell on her right side, followed by immediate pain in her right hip and unable to ambulate. Patient found to have a right hip fracture. Ortho consulted and patient admitted for further treatment     Interval Problem Update  Patient seen and examined, afebrile, had surgical repair with ortho this morning, afebrile   Pain management   PT/OT mariaa     I have discussed this patient's plan of care and discharge plan at IDT rounds today with Case Management, Nursing, Nursing leadership, and other members of the IDT team.    Consultants/Specialty  orthopedics    Code Status  Full Code    Disposition  Patient is not medically cleared for discharge.   Anticipate discharge to  TBD  .  I have placed the appropriate orders for post-discharge needs.    Review of Systems  ROS     Physical Exam  Temp:  [36.1 °C (97 °F)-36.6 °C (97.9 °F)] 36.3 °C (97.3 °F)  Pulse:  [7-78] 73  Resp:  [12-18] 17  BP: ()/(55-61) 92/57  SpO2:  [91 %-99 %] 99 %    Physical Exam    Fluids    Intake/Output Summary (Last 24 hours) at 11/10/2022 1243  Last data filed at 11/10/2022 0830  Gross per 24 hour   Intake 996.84 ml   Output 425 ml   Net 571.84 ml       Laboratory  Recent Labs     11/10/22  0438   WBC 5.1   RBC 3.41*   HEMOGLOBIN 12.7   HEMATOCRIT 36.8*   .9*   MCH 37.2*   MCHC 34.5   RDW 50.4*   PLATELETCT 61*   MPV 10.2     Recent Labs      11/10/22  0438   SODIUM 134*   POTASSIUM 5.0   CHLORIDE 101   CO2 21   GLUCOSE 119*   BUN 33*   CREATININE 1.19   CALCIUM 9.4     Recent Labs     11/10/22  0438   INR 1.08               Imaging  DX-PORTABLE FLUOROSCOPY < 1 HOUR Is the patient pregnant? Unknown   Final Result      Portable fluoroscopy utilized for 20 seconds.         INTERPRETING LOCATION: [address] [place] [address] [place], [id number]      DX-HIP-UNILATERAL-W/O PELVIS-2/3 VIEWS RIGHT   Final Result      Portable fluoroscopy as described.      [id number] FILM X-RAY   Final Result           Assessment/Plan  * Hip fracture, right, closed, initial encounter (HCC)  Assessment & Plan  S/p GLF  Fracture noted on x-ray  Supportive pain control  Ortho consulted and surgical repair today   PT/OT after the OR  Orthopedic following appreciate rec       Intertrochanteric fracture of right hip, closed, initial encounter (HCC)- (present on admission)  Assessment & Plan  As above    Fall  Assessment & Plan  Fall precautions    Hypertension  Assessment & Plan  Holding BP meds for now as BP on the low side     Alcohol dependence ([facility])  Assessment & Plan  Detox bag, MV    Right hip pain  Assessment & Plan  As above       VTE prophylaxis: enoxaparin ppx    I have performed a physical exam and reviewed and updated ROS and Plan today (11/10/2022). In review of yesterday's note (11/9/2022), there are no changes except as documented above.

## 2022-11-10 NOTE — H&P
This version of the note has been redacted during the course of a chart correction case. If you need access to the original text of this version of the note, please contact the Health Information Management department at [phone].

## 2022-11-10 NOTE — ANESTHESIA PREPROCEDURE EVALUATION
Case: [id number] Date/Time: 11/10/22 0745    Procedure: INSERTION, INTRAMEDULLARY VIKI, FEMUR, PROXIMAL (Right)    Location: [facility] OR 16 / SURGERY [facility]    Surgeons: Gregory Vu M.D.        67 yo woman s/p mechanical fall.  HTN  Plt 61 (11/10)    Drinks wine daily. Unable to quantify.     Never had surgery or anesthesia before. No family hx of anesthesia complications.    Denies Mi, CVA, SOB/CP on exertion or at rest. METS>4. NPO >8hrs.     Relevant Problems   CARDIAC   (positive) Hypertension       Physical Exam    Airway   Mallampati: II  TM distance: >3 FB  Neck ROM: full       Cardiovascular - normal exam  Rhythm: regular  Rate: normal  (-) murmur     Dental - normal exam      Very poor dentition   Pulmonary - normal exam  Breath sounds clear to auscultation     Abdominal    Neurological - normal exam         Other findings: No loose teeth             Anesthesia Plan    ASA 2       Plan - general       Airway plan will be ETT          Induction: intravenous    Postoperative Plan: Postoperative administration of opioids is intended.    Pertinent diagnostic labs and testing reviewed    Informed Consent:    Anesthetic plan and risks discussed with patient.    Use of blood products discussed with: patient whom consented to blood products.

## 2022-11-10 NOTE — CARE PLAN
Problem: Knowledge Deficit - Standard  Goal: Patient and family/care givers will demonstrate understanding of plan of care, disease process/condition, diagnostic tests and medications    Outcome: Progressing  Note: POC discussed with the patient. PT and OT Eval. Questions answered. Verbalized understanding.     Problem: Pain - Standard  Goal: Alleviation of pain or a reduction in pain to the patient’s comfort goal  utcome: Progressing  Note: Educated on pain scale. Encouraged to verbalize pain. Ice pack provided prn. Will medicate as per MAR. Verbalized understanding.        Problem: Fall Risk  Goal: Patient will remain free from falls    Outcome: Progressing  Note: Fall protocol in effect. Non skid socks in use. Call light within reach. Reminded patient to call for assist. Hourly rounds in effect. Bed alarm placed. Bed in lowest position. Kept room clutter free. Verbalized understanding.     The patient is Stable - Low risk of patient condition declining or worsening    Shift Goals  Clinical Goals: pain control, safety, mobility  Patient Goals: pain control, comfort  Family Goals: no family member present    Progress made toward(s) clinical / shift goals:      Patient is not progressing towards the following goals:

## 2022-11-10 NOTE — H&P
Hospital Medicine History & Physical Note    Date of Service  11/9/2022    Primary Care Physician  No primary care provider on file.    Consultants  Orthopedic (Dr. Vu)    Code Status  Full Code    Chief Complaint  No chief complaint on file.  Right hip pain, GLF    History of Presenting Illness  Rosalva Hunter is a 61 y.o. female with history of hypertension who presented 11/9/2022 to [facility] ED with fall, found to have right hip fracture on x-ray, transferred to [facility] as direct admit for orthopedic evaluation.  Patient reported having mechanical fall earlier today as she was trying to help her  who is dependent on home oxygen due to COPD.  She reported having slipped/tripped and fell on her right side, followed by immediate pain in her right hip and unable to ambulate.  Patient was seen by me at [facility] arrival, admitted to medicine service.  No preop labs included -- Labs ordered for tomorrow.  Requested included CD imaging of x-ray hip to be uploaded to epic.  I consulted on-call orthopedic, tentative plan for intervention tomorrow.    I discussed the plan of care with patient, bedside RN, and orthopedics.    Review of Systems  Review of Systems   Constitutional:  Negative for chills and fever.   HENT:  Negative for hearing loss and tinnitus.    Eyes:  Negative for blurred vision and double vision.   Respiratory:  Negative for cough and shortness of breath.    Cardiovascular:  Negative for chest pain and palpitations.   Gastrointestinal:  Negative for heartburn and nausea.   Genitourinary:  Negative for dysuria and flank pain.   Musculoskeletal:  Positive for falls, joint pain and myalgias.   Skin:  Negative for itching and rash.   Neurological:  Negative for dizziness and headaches.   Endo/Heme/Allergies:  Negative for environmental allergies. Does not bruise/bleed easily.   Psychiatric/Behavioral:  Negative for depression and substance abuse.    All other systems reviewed and are  negative.     Past Medical History   has a past medical history of Renal disorder.  Hypertension  Prior pelvis fracture after fall, treated nonsurgically     Surgical History   has no past surgical history on file.   Denies PSH     Family History  family history is not on file.   Family history reviewed with patient. There is no family history that is pertinent to the chief complaint.      Social History   reports that she has never smoked. She has never used smokeless tobacco. She reports current alcohol use. She reports that she does not use drugs.  Denies tobacco use  Denies social EtOH use  Denies illicit drug use    Allergies  None    Medications  None         Physical Exam  Vitals and nursing note reviewed.   Constitutional:       General: She is not in acute distress.  HENT:      Head: Normocephalic and atraumatic.      Nose: Nose normal.      Mouth/Throat:      Mouth: Mucous membranes are moist.      Pharynx: Oropharynx is clear.   Eyes:      General: No scleral icterus.     Extraocular Movements: Extraocular movements intact.   Cardiovascular:      Rate and Rhythm: Normal rate and regular rhythm.      Pulses: Normal pulses.      Heart sounds:     No friction rub.   Pulmonary:      Effort: No respiratory distress.      Breath sounds: No stridor. No wheezing or rales.   Abdominal:      General: There is no distension.      Tenderness: There is no abdominal tenderness. There is no guarding or rebound.   Musculoskeletal:         General: Tenderness and signs of injury present.      Cervical back: Neck supple. No tenderness.      Comments: Tender right hip, decreased ROM   Skin:     General: Skin is warm and dry.      Capillary Refill: Capillary refill takes less than 2 seconds.   Neurological:      General: No focal deficit present.      Mental Status: She is alert and oriented to person, place, and time.   Psychiatric:         Mood and Affect: Mood normal.     Laboratory:          No results for input(s):  ALTSGPT, ASTSGOT, ALKPHOSPHAT, TBILIRUBIN, DBILIRUBIN, GAMMAGT, AMYLASE, LIPASE, ALB, PREALBUMIN, GLUCOSE in the last 72 hours.      No results for input(s): NTPROBNP in the last 72 hours.      No results for input(s): TROPONINT in the last 72 hours.    Imaging:  [id number] FILM X-RAY   Final Result          no X-Ray or EKG requiring interpretation    Assessment/Plan:  Justification for Admission Status  I anticipate this patient will require at least two midnights of hospitalization, therefore appropriate for inpatient status.      * Hip fracture, right, closed, initial encounter ([facility])  Assessment & Plan  S/p GLF  Fracture noted on x-ray  Supportive pain control  PT/OT after the OR  Orthopedic follow-up appreciated      Intertrochanteric fracture of right hip, closed, initial encounter ([facility])- (present on admission)  Assessment & Plan  As above    Right hip pain  Assessment & Plan  As above    Hypertension  Assessment & Plan  Resume once able to verify home med  PRN labetalol    Alcohol dependence ([facility])  Assessment & Plan  Detox bag, MV    Fall  Assessment & Plan  Fall precautions      VTE prophylaxis: enoxaparin ppx

## 2022-11-10 NOTE — PROGRESS NOTES
0715 Report received from CARMEL Beebe RN at the beginning of the shift. Patient's in surgery during shift report.     0826 Report received from JULES StarkU RN.    0846 Received patient from PACU via bed accompanied by one male transport.    0855 Patient's sitting up in bed. Educated on the importance/use of IS at least 10x every hour while awake, able to reach 1250. FC to DD. Fall protocol in effect. Call light within reach. Reminded patient to call for assist. Assessment completed. No distress noted. Plan of care reviewed with the patient. Verbalized understanding.    1033 Notified JOEL Alicia that patient's surgical dressing were saturated, new order received and acknowledged (see nursing communication).    1150 Dressings to right hip changed as per order.    1233 New order received and acknowledged from Dr Jacinto (see MAR) and labs.     1300 Patient's sitting up in bed, having lunch. No distress noted.     1530 Patient's in bed. No distress noted.     1610 Medicated with Oxycodone (see MAR) fr c/o's right hip pain, rates pain 4/10, ice pack given. Daughter visiting.     1730 Patient's sitting up in bed, having Dinner. No distress noted.

## 2022-11-10 NOTE — DISCHARGE PLANNING
Anticipated Discharge Disposition: Unknown    Action: Call from  of pt to VM of ER CM at [facility] upset looking update and status of pt. Called back and transferred call to [facility] . Pt is not in [facility] . No info provided will leave that to care team for her    Barriers to Discharge: IUnknown    Plan: No further [facility] needs

## 2022-11-10 NOTE — H&P
Hospital Medicine History & Physical Note     Date of Service  11/9/2022     Primary Care Physician  Nishi Wang M.D.     Consultants  Orthopedic (Dr. Vu)     Code Status  Full Code     Chief Complaint  No chief complaint on file.  Fall, right hip pain     History of Presenting Illness  Rosalva Hunter is a 61 y.o. female with history of hypertension who presented 11/9/2022 to [facility] ED with fall, found to have right hip fracture on x-ray, transferred to [facility] as direct admit for orthopedic evaluation.  Patient reported having mechanical fall earlier today as she was trying to help her  who is dependent on home oxygen due to COPD.  She reported having slipped/tripped and fell on her right side, followed by immediate pain in her right hip and unable to ambulate.  Patient was seen by me at [facility] arrival, admitted to medicine service.  No preop labs included -- Labs ordered for tomorrow.  Requested included CD imaging of x-ray hip to be uploaded to epic.  I consulted on-call orthopedic, tentative plan for intervention tomorrow.     I discussed the plan of care with patient, bedside RN, and orthopedics.     Review of Systems  Review of Systems   Constitutional:  Negative for chills and fever.   HENT:  Negative for hearing loss and tinnitus.    Eyes:  Negative for blurred vision and double vision.   Respiratory:  Negative for cough and shortness of breath.    Cardiovascular:  Negative for chest pain and palpitations.   Gastrointestinal:  Negative for heartburn and nausea.   Genitourinary:  Negative for dysuria and flank pain.   Musculoskeletal:  Positive for falls, joint pain and myalgias.   Skin:  Negative for itching and rash.   Neurological:  Negative for dizziness and headaches.   Endo/Heme/Allergies:  Negative for environmental allergies. Does not bruise/bleed easily.   Psychiatric/Behavioral:  Negative for depression and substance abuse.    All other systems reviewed and are  negative.     Past Medical History   has a past medical history of Renal disorder.  Hypertension  Prior pelvis fracture after fall, treated nonsurgically     Surgical History   has no past surgical history on file.   Denies PSH     Family History  family history is not on file.   Family history reviewed with patient. There is no family history that is pertinent to the chief complaint.      Social History   reports that she has never smoked. She has never used smokeless tobacco. She reports current alcohol use. She reports that she does not use drugs.  Denies tobacco use  Denies social EtOH use  Denies illicit drug use     Allergies       Allergies   Allergen Reactions    Ciprofloxacin           Medications  Prior to Admission Medications   Prescriptions Last Dose Informant Patient Reported? Taking?   amoxicillin-clavulanate (AUGMENTIN) 875-125 MG TABS     No No   Sig: Take 1 Tab by mouth 2 times a day.   hydrocod polst-chlorphen polst (TUSSIONEX PENNKINETIC ER) 10-8 MG/5ML LQCR     No No   Sig: Take 5 mL by mouth every 12 hours.      Facility-Administered Medications: None         Physical Exam  Temp:  [36.5 °C (97.7 °F)] 36.5 °C (97.7 °F)  Pulse:  [87] 87  Resp:  [19] 19  BP: (108)/(64) 108/64  SpO2:  [92 %] 92 %                            Physical Exam  Vitals and nursing note reviewed.   Constitutional:       General: She is not in acute distress.  HENT:      Head: Normocephalic and atraumatic.      Nose: Nose normal.      Mouth/Throat:      Mouth: Mucous membranes are moist.      Pharynx: Oropharynx is clear.   Eyes:      General: No scleral icterus.     Extraocular Movements: Extraocular movements intact.   Cardiovascular:      Rate and Rhythm: Normal rate and regular rhythm.      Pulses: Normal pulses.      Heart sounds:     No friction rub.   Pulmonary:      Effort: No respiratory distress.      Breath sounds: No stridor. No wheezing or rales.   Abdominal:      General: There is no distension.       Tenderness: There is no abdominal tenderness. There is no guarding or rebound.   Musculoskeletal:         General: Tenderness and signs of injury present.      Cervical back: Neck supple. No tenderness.      Comments: Tender right hip, decreased ROM   Skin:     General: Skin is warm and dry.      Capillary Refill: Capillary refill takes less than 2 seconds.   Neurological:      General: No focal deficit present.      Mental Status: She is alert and oriented to person, place, and time.   Psychiatric:         Mood and Affect: Mood normal.         Laboratory:          No results for input(s): ALTSGPT, ASTSGOT, ALKPHOSPHAT, TBILIRUBIN, DBILIRUBIN, GAMMAGT, AMYLASE, LIPASE, ALB, PREALBUMIN, GLUCOSE in the last 72 hours.      No results for input(s): NTPROBNP in the last 72 hours.      No results for input(s): TROPONINT in the last 72 hours.     Imaging:  No orders to display         no X-Ray or EKG requiring interpretation     Assessment/Plan:  Justification for Admission Status  I anticipate this patient will require at least two midnights hospitalization, therefore appropriate for inpatient status.           * Hip fracture, right, closed, initial encounter ([facility])- (present on admission)  Assessment & Plan  S/p GLF  Fracture noted on x-ray  Supportive pain control  PT/OT after the OR  Orthopedic follow-up appreciated     Right hip pain  Assessment & Plan  As above     Alcohol use  Assessment & Plan  Denies abuse  Detox bag, MV        Hypertension  Assessment & Plan  Resume home med once able to verify  As needed labetalol     Fall  Assessment & Plan  Fall precautions        VTE prophylaxis: enoxaparin ppx

## 2022-11-10 NOTE — CONSULTS
"11/10/2022    The patient was seen at the request of Dr Silva    HPI: Rosalva Hunter is a 66 y.o. female who presents with complaints of pain to right hip.  This started yesterday after fall.  The pain is 6/10 and is described as sharp.  The pain is made worse by palpation of the area and made better by rest and immobilization.    No past medical history on file.    No past surgical history on file.    Medications  No current facility-administered medications on file prior to encounter.     Current Outpatient Medications on File Prior to Encounter   Medication Sig Dispense Refill    furosemide (LASIX) 40 MG Tab Take 1 Tablet by mouth every day. Indications: High Blood Pressure Disorder      spironolactone (ALDACTONE) 100 MG Tab Take 1 Tablet by mouth every day. Indications: High Blood Pressure Disorder      atenolol (TENORMIN) 50 MG Tab Take 1 Tablet by mouth every day. Indications: High Blood Pressure Disorder         Allergies  Patient has no known allergies.    ROS  Right hip pain. All other systems were reviewed and found to be negative    No family history on file.    Social History     Socioeconomic History    Marital status:        Physical Exam  Vitals  /61   Pulse 72   Temp 36.1 °C (97 °F) (Temporal)   Resp 18   Ht 1.676 m (5' 6\")   Wt 64.1 kg (141 lb 5 oz)   SpO2 96%   General: Well Developed, Well Nourished, Age appropriate appearance  HEENT: Normocephalic, atraumatic  Psych: Normal mood and affect  Neck: Supple, nontender, no masses  Lungs: Breathing unlabored, No audible wheezing  Heart: Regular heart rate and rhythm  Abdomen: Soft, NT, ND  Neuro: Sensation grossly intact to BUE and BLE, moving all four extremities  Skin: Intact, no open wounds  Vascular: 2+DP/PT, Capillary refill <2 seconds  MSK: Right hip pain, LLE      Radiographs:  Right IT femur fracture  [id number] FILM X-RAY   Final Result          Laboratory Values  Recent Labs     11/10/22  0438   WBC 5.1   RBC " 3.41*   HEMOGLOBIN 12.7   HEMATOCRIT 36.8*   .9*   MCH 37.2*   MCHC 34.5   RDW 50.4*   PLATELETCT 61*   MPV 10.2     Recent Labs     11/10/22  0438   SODIUM 134*   POTASSIUM 5.0   CHLORIDE 101   CO2 21   GLUCOSE 119*   BUN 33*     Recent Labs     11/10/22  0438   INR 1.08         Impression:Right intertrochanteric femur fracture    Plan:We discussed the diagnosis and findings with the patient at length.  We reviewed possible non operative and operative interventions and the risks and benefits of each of these.  she had a chance to ask questions and all of these were answered to her satisfaction. The patient chose to proceed with  operative intervention. Risks and benefits of surgery were discussed which include but are not limited to bleeding, infection, neurovascular damage, malunion, nonunion, instability, limb length discrepancy, DVT, PE, MI, Stroke and death. They understand these risks and wish to proceed.

## 2022-11-10 NOTE — PROGRESS NOTES
I spoke with Dr. Garcia at [facility] in [place]. Ms. Hunter has a past medical history of controlled hypertension though has no known medical conditions that had the misfortune of a ground-level fall and sustained a right intertrochanteric hip fracture.  They do not have orthopedics and they are critical access hospital.  She does not have any other discernible trauma.  Be transferred here and will be made n.p.o. at midnight and orthopedics will either be called this evening or tomorrow morning.  These call the hospitalist on-call when she gets here.

## 2022-11-10 NOTE — PROGRESS NOTES
4 Eyes Skin Assessment Completed by OMER Ball and OMER Garcia.    Head WDL  Ears WDL  Nose WDL  Mouth WDL  Neck WDL  Breast/Chest rashes on right upper chestm right under breast  Shoulder Blades WDL  Spine Bruising right flank area  (R) Arm/Elbow/Hand Bruising right upper arm and elbow, rash on right AC  (L) Arm/Elbow/Hand WDL  Abdomen rashes on abdomen  Groin rashes on inguinal area  Scrotum/Coccyx/Buttocks Redness and Blanching  (R) Leg Rashes on RLE  (L) Leg Rashes on left upper thigh, bruising on left knee  (R) Heel/Foot/Toe Rash on top of right foot  (L) Heel/Foot/Toe Rash on left foot          Devices In Places Pulse Ox, Melendez, SCD's, and Nasal Cannula      Interventions In Place NC W/Ear Foams, Pillows, and Pressure Redistribution Mattress    Possible Skin Injury No    Pictures Uploaded Into Epic N/A  Wound Consult Placed N/A  RN Wound Prevention Protocol Ordered No

## 2022-11-10 NOTE — ANESTHESIA PROCEDURE NOTES
Airway    Date/Time: 11/10/2022 7:17 AM  Performed by: Ariella Vazquez M.D.  Authorized by: Ariella Vazquez M.D.     Location:  OR  Urgency:  Elective  Indications for Airway Management:  Anesthesia      Spontaneous Ventilation: absent    Sedation Level:  Deep  Preoxygenated: Yes    Patient Position:  Sniffing  Mask Difficulty Assessment:  1 - vent by mask  Final Airway Type:  Endotracheal airway  Final Endotracheal Airway:  ETT  Cuffed: Yes    Technique Used for Successful ETT Placement:  Direct laryngoscopy    Insertion Site:  Oral  Blade Type:  Richardson  Laryngoscope Blade/Videolaryngoscope Blade Size:  2  ETT Size (mm):  7.0  Measured from:  Teeth  ETT to Teeth (cm):  21  Placement Verified by: auscultation and capnometry    Cormack-Lehane Classification:  Grade I - full view of glottis  Number of Attempts at Approach:  1

## 2022-11-10 NOTE — ASSESSMENT & PLAN NOTE
S/p GLF  Fracture noted on x-ray  Supportive pain control  Ortho consulted and surgical repair on 11/10   -- pt and ot recs home with home health   -- Multimodal pain management

## 2022-11-10 NOTE — ANESTHESIA TIME REPORT
Anesthesia Start and Stop Event Times     Date Time Event    11/10/2022 0650 Ready for Procedure     0711 Anesthesia Start     0800 Anesthesia Stop        Responsible Staff  11/10/22    Name Role Begin End    Ariella Vazquez M.D. Anesth 0711 0800        Overtime Reason:  no overtime (within assigned shift)    Comments:

## 2022-11-10 NOTE — OP REPORT
DATE OF OPERATION: 11/10/2022     PREOPERATIVE DIAGNOSIS: Right intertrochanteric femur fracture    POSTOPERATIVE DIAGNOSIS: Same    PROCEDURE PERFORMED: Surgical treatment of right intertrochanteric femur fracture with intramedullary device    SURGEON: Gregory Vu M.D.     ASSISTANT: None    ANESTHESIA: General    ESTIMATED BLOOD LOSS: 50 mL    INDICATIONS: The patient is a 66 y.o. female with a right intertrochanteric femur fracture resulting from a ground level fall.  The patient denies antecedent pain, and was found to have a normal neurovascular exam and skin envelope.  Radiographs reviewed by myself demonstrated the intertrochanteric femur fracture.  Given these findings, surgical treatment of the intertrochanteric fracture with an intramedullary device was indicated.  I discussed the risks and benefits of the procedure, including the risks of infection, wound healing complication, neurovascular injury, persistent hip pain, malunion, non-union, malrotation, and the medical risks of anesthesia including DVT, PE, MI, stroke, and death.  Benefits include early mobilization, improved chance of union, and reduction in the medical risks of hip fractures.  Alternatives to surgery were also discussed, including non-operative management.  The patient signed the informed consent and the operative extremity was marked.      PROCEDURE:  The patient underwent anesthesia, and was positioned supine on the fracture table and all bony prominences were well padded.  Preoperative antibiotics were administered. Sequential compression devices were employed.  Preoperative imaging was obtained, demonstrated reduction of the fracture using the table. The correct operative site was prepped and draped into a sterile field. A procedural pause was conducted to verify correct patient, correct extremity, presence of the surgeons initials on the operative extremity.    The guide pin for the OIC hip nail was placed into the tip of the  greater trochanter and advanced under fluoroscopic guidance to the appropriate position. An incision was made around the pin, and the entry reamer was then used to gain access to the femoral canal.  The guide pin was then removed.    A long ball-tip guide wire was advanced down the femur to the knee, its position confirmed on fluoroscopy.  We then measured for, and selected a 180 x 11 x 127.5 OIC hip nail.  We then sequentially reamed to a size 12.5 mm reamer, and advanced the nail over the guide pin to an appropriate depth, confirmed by fluoroscopy.    The soft tissue sleeve for the lag screw was then advanced down to bone through a lateral thigh incision.  The guide pin was advanced to a central position within the femoral neck/head, confirmed by fluoroscopy.  We measured our lag screw and reamed for our lag screw.  We then placed the lag screw by hand.  The fracture was compressed, and the set screw was locked proximally.  All instruments were removed from the proximal femur, and final fluoroscopic images obtained.    We then obtained perfect [place] imaging distally, and placed one statically interlocked distal screw, obtaining good bicortical purchase.    All wounds were irrigated  with normal saline, and closed in layers, with 2-0 Vicryl in the subcutaneous tissue, and staples in the skin.  Sterile dressings were applied.       The patient tolerated the procedure well. There were no apparent complications. All sponge, needle, and instrument counts were correct on two separate occasions. She was awakened, extubated, and transferred to the recovery room in satisfactory condition.     Post-Operative Plan:    1.  The patient should bear weight as tolerated on their operative extremity.  Gait aids (crutch or crutches, cane, walker) may be used as needed, and may be discontinued when no longer required.  2.  IV antibiotics - may be continued for 24 hours, but are not required.  3.  DVT prophylaxis - SCD's and Lovenox  40 mg SQ daily while inpatient.  The patient may transition to Aspirin 325 mg PO BID as an outpatient  4.  Discharge planning  ____________________________________   Gregory Vu M.D.   DD: 11/10/2022  7:50 AM

## 2022-11-10 NOTE — ANESTHESIA POSTPROCEDURE EVALUATION
Patient: Rosalva Hunter    Procedure Summary     Date: 11/10/22 Room / Location: [place] [address] / SURGERY [facility]    Anesthesia Start: 0711 Anesthesia Stop: 0800    Procedure: INSERTION, INTRAMEDULLARY VIKI, FEMUR, PROXIMAL (Right: Hip) Diagnosis: (right hip frature)    Surgeons: Gregory Vu M.D. Responsible Provider: Ariella Vazquez M.D.    Anesthesia Type: general ASA Status: 2          Final Anesthesia Type: general  Last vitals  BP   Blood Pressure : (!) 90/55    Temp   36.1 °C (97 °F)    Pulse   72   Resp   18    SpO2   91 %      Anesthesia Post Evaluation    Patient location during evaluation: PACU  Patient participation: complete - patient participated  Level of consciousness: sleepy but conscious  Pain score: 2    Airway patency: patent  Anesthetic complications: no  Cardiovascular status: hemodynamically stable  Respiratory status: acceptable and face mask  Hydration status: euvolemic    PONV: none          There were no known notable events for this encounter.     Nurse Pain Score: 10 (NPRS)

## 2022-11-11 PROBLEM — D64.9 ANEMIA: Status: ACTIVE | Noted: 2022-11-11

## 2022-11-11 PROBLEM — D69.6 THROMBOCYTOPENIA (HCC): Status: ACTIVE | Noted: 2022-11-11

## 2022-11-11 LAB
ANION GAP SERPL CALC-SCNC: 8 MMOL/L (ref 7–16)
BUN SERPL-MCNC: 36 MG/DL (ref 8–22)
CALCIUM SERPL-MCNC: 8.6 MG/DL (ref 8.5–10.5)
CHLORIDE SERPL-SCNC: 102 MMOL/L (ref 96–112)
CO2 SERPL-SCNC: 21 MMOL/L (ref 20–33)
CREAT SERPL-MCNC: 1.15 MG/DL (ref 0.5–1.4)
ERYTHROCYTE [DISTWIDTH] IN BLOOD BY AUTOMATED COUNT: 51 FL (ref 35.9–50)
GFR SERPLBLD CREATININE-BSD FMLA CKD-EPI: 53 ML/MIN/1.73 M 2
GLUCOSE SERPL-MCNC: 160 MG/DL (ref 65–99)
HAV IGM SERPL QL IA: NORMAL
HBV CORE IGM SER QL: NORMAL
HBV SURFACE AG SER QL: NORMAL
HCT VFR BLD AUTO: 29.5 % (ref 37–47)
HCV AB SER QL: NORMAL
HGB BLD-MCNC: 9.8 G/DL (ref 12–16)
HIV 1+2 AB+HIV1 P24 AG SERPL QL IA: NORMAL
IRON SATN MFR SERPL: 30 % (ref 15–55)
IRON SERPL-MCNC: 60 UG/DL (ref 40–170)
LDH SERPL L TO P-CCNC: 236 U/L (ref 107–266)
MCH RBC QN AUTO: 37.1 PG (ref 27–33)
MCHC RBC AUTO-ENTMCNC: 33.2 G/DL (ref 33.6–35)
MCV RBC AUTO: 111.7 FL (ref 81.4–97.8)
PLATELET # BLD AUTO: 40 K/UL (ref 164–446)
PMV BLD AUTO: 10.7 FL (ref 9–12.9)
POTASSIUM SERPL-SCNC: 5.5 MMOL/L (ref 3.6–5.5)
RBC # BLD AUTO: 2.64 M/UL (ref 4.2–5.4)
SODIUM SERPL-SCNC: 131 MMOL/L (ref 135–145)
TIBC SERPL-MCNC: 200 UG/DL (ref 250–450)
TSH SERPL DL<=0.005 MIU/L-ACNC: 1.1 UIU/ML (ref 0.38–5.33)
UIBC SERPL-MCNC: 140 UG/DL (ref 110–370)
VIT B12 SERPL-MCNC: 1981 PG/ML (ref 211–911)
WBC # BLD AUTO: 4.8 K/UL (ref 4.8–10.8)

## 2022-11-11 PROCEDURE — 99232 SBSQ HOSP IP/OBS MODERATE 35: CPT | Performed by: HOSPITALIST

## 2022-11-11 PROCEDURE — 770001 HCHG ROOM/CARE - MED/SURG/GYN PRIV*

## 2022-11-11 PROCEDURE — 700102 HCHG RX REV CODE 250 W/ 637 OVERRIDE(OP): Performed by: STUDENT IN AN ORGANIZED HEALTH CARE EDUCATION/TRAINING PROGRAM

## 2022-11-11 PROCEDURE — 80074 ACUTE HEPATITIS PANEL: CPT

## 2022-11-11 PROCEDURE — HZ2ZZZZ DETOXIFICATION SERVICES FOR SUBSTANCE ABUSE TREATMENT: ICD-10-PCS | Performed by: HOSPITALIST

## 2022-11-11 PROCEDURE — A9270 NON-COVERED ITEM OR SERVICE: HCPCS | Performed by: ORTHOPAEDIC SURGERY

## 2022-11-11 PROCEDURE — 97161 PT EVAL LOW COMPLEX 20 MIN: CPT

## 2022-11-11 PROCEDURE — 36415 COLL VENOUS BLD VENIPUNCTURE: CPT

## 2022-11-11 PROCEDURE — 700111 HCHG RX REV CODE 636 W/ 250 OVERRIDE (IP): Performed by: ORTHOPAEDIC SURGERY

## 2022-11-11 PROCEDURE — 83550 IRON BINDING TEST: CPT

## 2022-11-11 PROCEDURE — 700101 HCHG RX REV CODE 250: Performed by: STUDENT IN AN ORGANIZED HEALTH CARE EDUCATION/TRAINING PROGRAM

## 2022-11-11 PROCEDURE — 84443 ASSAY THYROID STIM HORMONE: CPT

## 2022-11-11 PROCEDURE — 700102 HCHG RX REV CODE 250 W/ 637 OVERRIDE(OP): Performed by: ORTHOPAEDIC SURGERY

## 2022-11-11 PROCEDURE — 83540 ASSAY OF IRON: CPT

## 2022-11-11 PROCEDURE — 97166 OT EVAL MOD COMPLEX 45 MIN: CPT

## 2022-11-11 PROCEDURE — A9270 NON-COVERED ITEM OR SERVICE: HCPCS | Performed by: STUDENT IN AN ORGANIZED HEALTH CARE EDUCATION/TRAINING PROGRAM

## 2022-11-11 PROCEDURE — 700105 HCHG RX REV CODE 258: Performed by: INTERNAL MEDICINE

## 2022-11-11 PROCEDURE — 85027 COMPLETE CBC AUTOMATED: CPT

## 2022-11-11 PROCEDURE — 87389 HIV-1 AG W/HIV-1&-2 AB AG IA: CPT

## 2022-11-11 PROCEDURE — 80048 BASIC METABOLIC PNL TOTAL CA: CPT

## 2022-11-11 PROCEDURE — 83615 LACTATE (LD) (LDH) ENZYME: CPT

## 2022-11-11 PROCEDURE — 82607 VITAMIN B-12: CPT

## 2022-11-11 RX ADMIN — ACETAMINOPHEN 650 MG: 325 TABLET, FILM COATED ORAL at 23:14

## 2022-11-11 RX ADMIN — DOCUSATE SODIUM 100 MG: 100 CAPSULE, LIQUID FILLED ORAL at 04:59

## 2022-11-11 RX ADMIN — OMEPRAZOLE 20 MG: 20 CAPSULE, DELAYED RELEASE ORAL at 04:59

## 2022-11-11 RX ADMIN — ACETAMINOPHEN 650 MG: 325 TABLET, FILM COATED ORAL at 16:41

## 2022-11-11 RX ADMIN — ACETAMINOPHEN 650 MG: 325 TABLET, FILM COATED ORAL at 04:59

## 2022-11-11 RX ADMIN — ACETAMINOPHEN 650 MG: 325 TABLET, FILM COATED ORAL at 11:56

## 2022-11-11 RX ADMIN — SENNOSIDES AND DOCUSATE SODIUM 1 TABLET: 50; 8.6 TABLET ORAL at 21:23

## 2022-11-11 RX ADMIN — LIDOCAINE 1 PATCH: 50 PATCH CUTANEOUS at 23:16

## 2022-11-11 RX ADMIN — OXYCODONE 5 MG: 5 TABLET ORAL at 08:35

## 2022-11-11 RX ADMIN — POLYETHYLENE GLYCOL 3350 1 PACKET: 17 POWDER, FOR SOLUTION ORAL at 08:36

## 2022-11-11 RX ADMIN — DOCUSATE SODIUM 100 MG: 100 CAPSULE, LIQUID FILLED ORAL at 16:42

## 2022-11-11 RX ADMIN — KETOROLAC TROMETHAMINE 15 MG: 30 INJECTION, SOLUTION INTRAMUSCULAR; INTRAVENOUS at 02:57

## 2022-11-11 RX ADMIN — SODIUM CHLORIDE: 9 INJECTION, SOLUTION INTRAVENOUS at 12:03

## 2022-11-11 ASSESSMENT — GAIT ASSESSMENTS
DISTANCE (FEET): 200
GAIT LEVEL OF ASSIST: STANDBY ASSIST
DEVIATION: BRADYKINETIC;ANTALGIC
ASSISTIVE DEVICE: FRONT WHEEL WALKER

## 2022-11-11 ASSESSMENT — COGNITIVE AND FUNCTIONAL STATUS - GENERAL
SUGGESTED CMS G CODE MODIFIER MOBILITY: CK
SUGGESTED CMS G CODE MODIFIER DAILY ACTIVITY: CJ
STANDING UP FROM CHAIR USING ARMS: A LITTLE
MOBILITY SCORE: 18
WALKING IN HOSPITAL ROOM: A LITTLE
TURNING FROM BACK TO SIDE WHILE IN FLAT BAD: A LITTLE
DRESSING REGULAR LOWER BODY CLOTHING: A LITTLE
CLIMB 3 TO 5 STEPS WITH RAILING: A LITTLE
HELP NEEDED FOR BATHING: A LITTLE
DAILY ACTIVITIY SCORE: 22
MOVING TO AND FROM BED TO CHAIR: A LITTLE
MOVING FROM LYING ON BACK TO SITTING ON SIDE OF FLAT BED: A LITTLE

## 2022-11-11 ASSESSMENT — PAIN DESCRIPTION - PAIN TYPE
TYPE: SURGICAL PAIN

## 2022-11-11 ASSESSMENT — PATIENT HEALTH QUESTIONNAIRE - PHQ9
2. FEELING DOWN, DEPRESSED, IRRITABLE, OR HOPELESS: NOT AT ALL
SUM OF ALL RESPONSES TO PHQ9 QUESTIONS 1 AND 2: 0
1. LITTLE INTEREST OR PLEASURE IN DOING THINGS: NOT AT ALL

## 2022-11-11 ASSESSMENT — ACTIVITIES OF DAILY LIVING (ADL): TOILETING: INDEPENDENT

## 2022-11-11 NOTE — THERAPY
Occupational Therapy   Initial Evaluation     Patient Name: Rosalva Hunter  Age:  66 y.o., Sex:  female  Medical Record #: [id number]  Today's Date: 11/11/2022    Precautions: (P) Weight Bearing As Tolerated Right Lower Extremity    Assessment  Patient is 66 y.o. female admitted after Glf with R hip pain found to have R hip fx now s/p femur IMN seen for OT evaluation. Pt demonstrated mod A for LB dressing, CGA for toileting, and CGA for standing ADLs. 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 Anticipate Dc home with HHOT services, however will follow to maximize independence prior to DC.    Plan    Recommend Occupational Therapy 4 times per week until therapy goals are met for the following treatments:  Adaptive Equipment, Cognitive Skill Development, Neuro Re-Education / Balance, Self Care/Activities of Daily Living, Therapeutic Activities, and Therapeutic Exercises.    DC Equipment Recommendations: (P) None  Discharge Recommendations: (P) Recommend home health for continued occupational therapy services      11/11/22 1432   Prior Living Situation   Prior Services Home-Independent   Housing / Facility [address]   Bathroom Set up Walk In Shower;Tub Transfer Bench   Equipment Owned Tub Transfer Bench;Grab Bar(s) In Tub / Shower;Grab Bar(s) By Toilet   Lives with - Patient's Self Care Capacity Spouse   Comments Spouse unable to assist, except for driving. They live in a handicap accessible senior home independently.   Prior Level of ADL Function   Self Feeding Independent   Grooming / Hygiene Independent   Bathing Independent   Dressing Independent   Toileting Independent   Prior Level of IADL Function   Medication Management Independent   Laundry Independent   Kitchen Mobility Independent   Finances Independent   Home Management Independent   Shopping Independent   Prior Level Of Mobility Independent Without Device in Community   Precautions    Precautions Weight Bearing As Tolerated Right Lower Extremity   Cognition    Cognition / Consciousness X   Comments agreeable, appeared anxious throughout regarding DC home   Active ROM Upper Body   Active ROM Upper Body  WDL   Strength Upper Body   Upper Body Strength  WDL   Balance Assessment   Sitting Balance (Static) Fair   Sitting Balance (Dynamic) Fair   Standing Balance (Static) Fair   Standing Balance (Dynamic) Fair   Weight Shift Sitting Good   Weight Shift Standing Fair   Comments with FWW   Bed Mobility    Supine to Sit Minimal Assist   Scooting Supervised   ADL Assessment   Grooming Contact Guard Assist;Standing   Lower Body Dressing Supervision   Toileting Contact Guard Assist   Comments pain with bending for LB dressing   How much help from another person does the patient currently need...   6 Clicks Daily Activity Score 22   Functional Mobility   Sit to Stand Contact Guard Assist   Bed, Chair, Wheelchair Transfer Contact Guard Assist   Toilet Transfers Contact Guard Assist   Patient / Family Goals   Patient / Family Goal #1 to go home   Short Term Goals   Short Term Goal # 1 Pt will demo LB dressing SPV   Short Term Goal # 2 Pt will tolerate 5-7 min standing ADLs SPV   Short Term Goal # 3 Pt will demo toileting SPV   Education Group   Education Provided Weight Bearing Precautions   Role of Occupational Therapist Patient Response Patient;Acceptance;Explanation   Weight Bearing Precautions Patient Response Patient;Acceptance;Explanation;Action Demonstration   Problem List   Problem List Decreased Active Daily Living Skills;Decreased Homemaking Skills;Decreased Functional Mobility;Decreased Activity Tolerance;Impaired Postural Control / Balance   Anticipated Discharge Equipment and Recommendations   DC Equipment Recommendations None   Discharge Recommendations Recommend home health for continued occupational therapy services

## 2022-11-11 NOTE — CARE PLAN
Problem: Knowledge Deficit - Standard  Goal: Patient and family/care givers will demonstrate understanding of plan of care, disease process/condition, diagnostic tests and medications  Outcome: Progressing  Note: POC discussed with the patient. PT and OT Eval. Questions answered. Verbalized understanding.     Problem: Pain - Standard  Goal: Alleviation of pain or a reduction in pain to the patient’s comfort goal  Outcome: Progressing  Note: Educated on pain scale. Encouraged to verbalize pain. Will medicate as per MAR.     Problem: Fall Risk  Goal: Patient will remain free from falls  Note: Fall protocol in effect. Non skid socks in use. Call light within reach. Reminded patient to call for assist. Hourly rounds in effect. Kept room clutter free. Verbalized understanding.       The patient is Stable - Low risk of patient condition declining or worsening    Shift Goals  Clinical Goals: pain control, safety, mobility  Patient Goals: pain control, PT and OT Eval  Family Goals: no family member present.    Progress made toward(s) clinical / shift goals:        Patient is not progressing towards the following goals:

## 2022-11-11 NOTE — CARE PLAN
The patient is Stable - Low risk of patient condition declining or worsening    Shift Goals  Clinical Goals: Pain management, mobility, safety  Patient Goals: pain control  Family Goals: not present    Progress made toward(s) clinical / shift goals:       Patient is not progressing towards the following goals:      Problem: Knowledge Deficit - Standard  Goal: Patient and family/care givers will demonstrate understanding of plan of care, disease process/condition, diagnostic tests and medications  Outcome: Progressing     Problem: Pain - Standard  Goal: Alleviation of pain or a reduction in pain to the patient’s comfort goal  Outcome: Progressing     Problem: Fall Risk  Goal: Patient will remain free from falls  Outcome: Progressing     Problem: Skin Integrity  Goal: Skin integrity is maintained or improved  Outcome: Progressing

## 2022-11-11 NOTE — PROGRESS NOTES
0650 Patient's sitting up in bed. Bedside report received from CARMEL Shen RN at the beginning of the shift..     0826 Report received from Mi PACU RN.    0846 Received patient from PACU via bed accompanied by one male transport.    0835 Patient's sitting up in bed. Educated on the importance/use of IS at least 10x every hour while awake, able to reach 1250. FC to DD. Medicated with Oxycodone (see MAR0 for c/o's right hip pain, rates pain 4/10, ice pack given. Fall protocol in effect. Call light within reach. Reminded patient to call for assist. Assessment completed. No distress noted. Plan of care reviewed with the patient. Verbalized understanding.    0930 Patient's in bed. No dsitress noted.    1150 Patient's in bed. No distress noted.      1310 Patient's in bed. No distress noted.    1430 TEODORO Wyman and AMEYA Colón worked with the patient. Ambulated patient in the hallway with FWW.     1445 Melendez catheter removed as per order.    1503 New order received and acknowledged for US abdomen complete.    1550 JOEL Alicia visited. POC discussed with the patient.    1603 New order received and acknowledged from Dr Zaman - labs.     1740 Patient's sitting up in bed, having Dinner. No distress noted.    1850 Patient's in bed. No changes in status. Bedside report given to Jelena BURT RN (Ac).

## 2022-11-11 NOTE — DOCUMENTATION QUERY
[facility]                                                                       Query Response Note      PATIENT:               GREGORIO JONES  ACCT #:                  [id number]  MRN:                     [id number]  :                      1955  ADMIT DATE:       2022 10:32 PM  DISCH DATE:          RESPONDING  PROVIDER #:        [id number]           QUERY TEXT:    Sodium lab values of 134 and 131 are noted in the Medical Record.  Can a diagnosis be specified to support this finding?    The patient's Clinical Indicators include:  Findings:  --Na from 11/10:  134  --Na from :  131    Treatment:  --Labs monitoring  --NS IV 100ml/hr continuous    Risk Factors:  --Right femur fracture s/p surgical repair  --HTN  --ETOH dependence    Thank you,  Aline Mason RN, BSN  Clinical   Connect via [employer]  Options provided:   -- Patient has hyponatremia   -- Patient does not have hyponatremia   -- Other explanation, please specify   -- Unable to determine      Query created by: Aline Mason on 2022 1:12 PM    RESPONSE TEXT:    Patient has hyponatremia          Electronically signed by:  NICHO ADDISON MD 2022 2:56 PM

## 2022-11-12 ENCOUNTER — APPOINTMENT (OUTPATIENT)
Dept: RADIOLOGY | Facility: MEDICAL CENTER | Age: 67
DRG: 481 | End: 2022-11-12
Attending: HOSPITALIST
Payer: MEDICARE

## 2022-11-12 PROBLEM — E27.40 ADRENAL INSUFFICIENCY (HCC): Status: ACTIVE | Noted: 2022-11-12

## 2022-11-12 PROBLEM — N17.9 AKI (ACUTE KIDNEY INJURY) (HCC): Status: ACTIVE | Noted: 2022-11-12

## 2022-11-12 PROBLEM — D61.818 PANCYTOPENIA (HCC): Status: ACTIVE | Noted: 2022-11-12

## 2022-11-12 LAB
ALBUMIN SERPL BCP-MCNC: 2.7 G/DL (ref 3.2–4.9)
AMMONIA PLAS-SCNC: 43 UMOL/L (ref 11–45)
BUN SERPL-MCNC: 44 MG/DL (ref 8–22)
CALCIUM SERPL-MCNC: 8.7 MG/DL (ref 8.5–10.5)
CHLORIDE SERPL-SCNC: 104 MMOL/L (ref 96–112)
CO2 SERPL-SCNC: 20 MMOL/L (ref 20–33)
CORTIS SERPL-MCNC: 0.6 UG/DL (ref 0–23)
CREAT SERPL-MCNC: 1.25 MG/DL (ref 0.5–1.4)
ERYTHROCYTE [DISTWIDTH] IN BLOOD BY AUTOMATED COUNT: 52 FL (ref 35.9–50)
GFR SERPLBLD CREATININE-BSD FMLA CKD-EPI: 48 ML/MIN/1.73 M 2
GLUCOSE SERPL-MCNC: 124 MG/DL (ref 65–99)
HCT VFR BLD AUTO: 29.7 % (ref 37–47)
HGB BLD-MCNC: 10 G/DL (ref 12–16)
MAGNESIUM SERPL-MCNC: 1.5 MG/DL (ref 1.5–2.5)
MCH RBC QN AUTO: 37.5 PG (ref 27–33)
MCHC RBC AUTO-ENTMCNC: 33.7 G/DL (ref 33.6–35)
MCV RBC AUTO: 111.2 FL (ref 81.4–97.8)
PHOSPHATE SERPL-MCNC: 2.5 MG/DL (ref 2.5–4.5)
PHOSPHATE SERPL-MCNC: 2.5 MG/DL (ref 2.5–4.5)
PLATELET # BLD AUTO: 52 K/UL (ref 164–446)
PMV BLD AUTO: 10.6 FL (ref 9–12.9)
POTASSIUM SERPL-SCNC: 5.4 MMOL/L (ref 3.6–5.5)
RBC # BLD AUTO: 2.67 M/UL (ref 4.2–5.4)
SODIUM SERPL-SCNC: 132 MMOL/L (ref 135–145)
WBC # BLD AUTO: 4.5 K/UL (ref 4.8–10.8)

## 2022-11-12 PROCEDURE — 80069 RENAL FUNCTION PANEL: CPT

## 2022-11-12 PROCEDURE — 700102 HCHG RX REV CODE 250 W/ 637 OVERRIDE(OP): Performed by: STUDENT IN AN ORGANIZED HEALTH CARE EDUCATION/TRAINING PROGRAM

## 2022-11-12 PROCEDURE — 82533 TOTAL CORTISOL: CPT

## 2022-11-12 PROCEDURE — 700111 HCHG RX REV CODE 636 W/ 250 OVERRIDE (IP): Performed by: HOSPITALIST

## 2022-11-12 PROCEDURE — 84100 ASSAY OF PHOSPHORUS: CPT

## 2022-11-12 PROCEDURE — 82140 ASSAY OF AMMONIA: CPT

## 2022-11-12 PROCEDURE — 700102 HCHG RX REV CODE 250 W/ 637 OVERRIDE(OP): Performed by: ORTHOPAEDIC SURGERY

## 2022-11-12 PROCEDURE — A9270 NON-COVERED ITEM OR SERVICE: HCPCS | Performed by: HOSPITALIST

## 2022-11-12 PROCEDURE — 83735 ASSAY OF MAGNESIUM: CPT

## 2022-11-12 PROCEDURE — 85027 COMPLETE CBC AUTOMATED: CPT

## 2022-11-12 PROCEDURE — A9270 NON-COVERED ITEM OR SERVICE: HCPCS | Performed by: STUDENT IN AN ORGANIZED HEALTH CARE EDUCATION/TRAINING PROGRAM

## 2022-11-12 PROCEDURE — 97164 PT RE-EVAL EST PLAN CARE: CPT

## 2022-11-12 PROCEDURE — 99233 SBSQ HOSP IP/OBS HIGH 50: CPT | Performed by: HOSPITALIST

## 2022-11-12 PROCEDURE — 700105 HCHG RX REV CODE 258: Performed by: HOSPITALIST

## 2022-11-12 PROCEDURE — 770001 HCHG ROOM/CARE - MED/SURG/GYN PRIV*

## 2022-11-12 PROCEDURE — 36415 COLL VENOUS BLD VENIPUNCTURE: CPT

## 2022-11-12 PROCEDURE — 700102 HCHG RX REV CODE 250 W/ 637 OVERRIDE(OP): Performed by: HOSPITALIST

## 2022-11-12 PROCEDURE — A9270 NON-COVERED ITEM OR SERVICE: HCPCS | Performed by: ORTHOPAEDIC SURGERY

## 2022-11-12 PROCEDURE — 76700 US EXAM ABDOM COMPLETE: CPT

## 2022-11-12 PROCEDURE — 51798 US URINE CAPACITY MEASURE: CPT

## 2022-11-12 RX ORDER — LORAZEPAM 2 MG/ML
2 INJECTION INTRAMUSCULAR
Status: DISCONTINUED | OUTPATIENT
Start: 2022-11-12 | End: 2022-11-14 | Stop reason: HOSPADM

## 2022-11-12 RX ORDER — LORAZEPAM 0.5 MG/1
0.5 TABLET ORAL EVERY 4 HOURS PRN
Status: DISCONTINUED | OUTPATIENT
Start: 2022-11-12 | End: 2022-11-14 | Stop reason: HOSPADM

## 2022-11-12 RX ORDER — HYDROCORTISONE 20 MG/1
20 TABLET ORAL EVERY MORNING
Status: DISCONTINUED | OUTPATIENT
Start: 2022-11-13 | End: 2022-11-14 | Stop reason: HOSPADM

## 2022-11-12 RX ORDER — GAUZE BANDAGE 2" X 2"
100 BANDAGE TOPICAL DAILY
Status: DISCONTINUED | OUTPATIENT
Start: 2022-11-12 | End: 2022-11-14 | Stop reason: HOSPADM

## 2022-11-12 RX ORDER — FOLIC ACID 1 MG/1
1 TABLET ORAL DAILY
Status: DISCONTINUED | OUTPATIENT
Start: 2022-11-12 | End: 2022-11-14 | Stop reason: HOSPADM

## 2022-11-12 RX ORDER — LORAZEPAM 2 MG/ML
1 INJECTION INTRAMUSCULAR
Status: DISCONTINUED | OUTPATIENT
Start: 2022-11-12 | End: 2022-11-14 | Stop reason: HOSPADM

## 2022-11-12 RX ORDER — LORAZEPAM 2 MG/ML
0.5 INJECTION INTRAMUSCULAR EVERY 4 HOURS PRN
Status: DISCONTINUED | OUTPATIENT
Start: 2022-11-12 | End: 2022-11-14 | Stop reason: HOSPADM

## 2022-11-12 RX ORDER — MAGNESIUM SULFATE HEPTAHYDRATE 40 MG/ML
2 INJECTION, SOLUTION INTRAVENOUS ONCE
Status: DISCONTINUED | OUTPATIENT
Start: 2022-11-12 | End: 2022-11-12

## 2022-11-12 RX ORDER — LORAZEPAM 2 MG/ML
1.5 INJECTION INTRAMUSCULAR
Status: DISCONTINUED | OUTPATIENT
Start: 2022-11-12 | End: 2022-11-14 | Stop reason: HOSPADM

## 2022-11-12 RX ORDER — HYDROCORTISONE 20 MG/1
10 TABLET ORAL EVERY EVENING
Status: DISCONTINUED | OUTPATIENT
Start: 2022-11-12 | End: 2022-11-14 | Stop reason: HOSPADM

## 2022-11-12 RX ORDER — SODIUM CHLORIDE, SODIUM LACTATE, POTASSIUM CHLORIDE, AND CALCIUM CHLORIDE .6; .31; .03; .02 G/100ML; G/100ML; G/100ML; G/100ML
1000 INJECTION, SOLUTION INTRAVENOUS ONCE
Status: COMPLETED | OUTPATIENT
Start: 2022-11-12 | End: 2022-11-12

## 2022-11-12 RX ORDER — LORAZEPAM 1 MG/1
1 TABLET ORAL EVERY 4 HOURS PRN
Status: DISCONTINUED | OUTPATIENT
Start: 2022-11-12 | End: 2022-11-14 | Stop reason: HOSPADM

## 2022-11-12 RX ORDER — LORAZEPAM 2 MG/1
2 TABLET ORAL
Status: DISCONTINUED | OUTPATIENT
Start: 2022-11-12 | End: 2022-11-14 | Stop reason: HOSPADM

## 2022-11-12 RX ORDER — LORAZEPAM 2 MG/1
4 TABLET ORAL
Status: DISCONTINUED | OUTPATIENT
Start: 2022-11-12 | End: 2022-11-14 | Stop reason: HOSPADM

## 2022-11-12 RX ORDER — MAGNESIUM SULFATE HEPTAHYDRATE 40 MG/ML
2 INJECTION, SOLUTION INTRAVENOUS ONCE
Status: COMPLETED | OUTPATIENT
Start: 2022-11-12 | End: 2022-11-12

## 2022-11-12 RX ADMIN — DOCUSATE SODIUM 100 MG: 100 CAPSULE, LIQUID FILLED ORAL at 17:21

## 2022-11-12 RX ADMIN — OMEPRAZOLE 20 MG: 20 CAPSULE, DELAYED RELEASE ORAL at 04:07

## 2022-11-12 RX ADMIN — Medication 100 MG: at 09:11

## 2022-11-12 RX ADMIN — ZOLPIDEM TARTRATE 5 MG: 5 TABLET ORAL at 23:41

## 2022-11-12 RX ADMIN — ACETAMINOPHEN 650 MG: 325 TABLET, FILM COATED ORAL at 04:07

## 2022-11-12 RX ADMIN — FOLIC ACID 1 MG: 1 TABLET ORAL at 09:11

## 2022-11-12 RX ADMIN — CYCLOBENZAPRINE 10 MG: 10 TABLET, FILM COATED ORAL at 21:05

## 2022-11-12 RX ADMIN — LORAZEPAM 0.5 MG: 0.5 TABLET ORAL at 15:29

## 2022-11-12 RX ADMIN — ACETAMINOPHEN 650 MG: 325 TABLET, FILM COATED ORAL at 23:41

## 2022-11-12 RX ADMIN — MAGNESIUM SULFATE HEPTAHYDRATE 2 G: 40 INJECTION, SOLUTION INTRAVENOUS at 09:15

## 2022-11-12 RX ADMIN — HYDROCORTISONE 10 MG: 20 TABLET ORAL at 17:21

## 2022-11-12 RX ADMIN — ACETAMINOPHEN 650 MG: 325 TABLET, FILM COATED ORAL at 11:14

## 2022-11-12 RX ADMIN — LORAZEPAM 0.5 MG: 0.5 TABLET ORAL at 09:31

## 2022-11-12 RX ADMIN — FAMOTIDINE 20 MG: 10 INJECTION INTRAVENOUS at 17:22

## 2022-11-12 RX ADMIN — SODIUM CHLORIDE, POTASSIUM CHLORIDE, SODIUM LACTATE AND CALCIUM CHLORIDE 1000 ML: 600; 310; 30; 20 INJECTION, SOLUTION INTRAVENOUS at 07:43

## 2022-11-12 RX ADMIN — LORAZEPAM 0.5 MG: 0.5 TABLET ORAL at 21:05

## 2022-11-12 RX ADMIN — DOCUSATE SODIUM 100 MG: 100 CAPSULE, LIQUID FILLED ORAL at 04:07

## 2022-11-12 RX ADMIN — FAMOTIDINE 20 MG: 10 INJECTION INTRAVENOUS at 09:11

## 2022-11-12 ASSESSMENT — COGNITIVE AND FUNCTIONAL STATUS - GENERAL
TURNING FROM BACK TO SIDE WHILE IN FLAT BAD: A LITTLE
SUGGESTED CMS G CODE MODIFIER MOBILITY: CK
MOVING FROM LYING ON BACK TO SITTING ON SIDE OF FLAT BED: A LITTLE
STANDING UP FROM CHAIR USING ARMS: A LITTLE
CLIMB 3 TO 5 STEPS WITH RAILING: A LITTLE
MOBILITY SCORE: 18
WALKING IN HOSPITAL ROOM: A LITTLE
MOVING TO AND FROM BED TO CHAIR: A LITTLE

## 2022-11-12 ASSESSMENT — GAIT ASSESSMENTS
DISTANCE (FEET): 200
DEVIATION: BRADYKINETIC;ANTALGIC
ASSISTIVE DEVICE: FRONT WHEEL WALKER
GAIT LEVEL OF ASSIST: STANDBY ASSIST

## 2022-11-12 ASSESSMENT — ENCOUNTER SYMPTOMS
HEADACHES: 0
COUGH: 0
NERVOUS/ANXIOUS: 1
ORTHOPNEA: 0
HEMOPTYSIS: 0
NAUSEA: 0
HEARTBURN: 0
DOUBLE VISION: 0
PALPITATIONS: 0
VOMITING: 0
ABDOMINAL PAIN: 0
SORE THROAT: 0
MYALGIAS: 1
FEVER: 0
FOCAL WEAKNESS: 0
BLURRED VISION: 0

## 2022-11-12 ASSESSMENT — LIFESTYLE VARIABLES
HAVE PEOPLE ANNOYED YOU BY CRITICIZING YOUR DRINKING: NO
AVERAGE NUMBER OF DAYS PER WEEK YOU HAVE A DRINK CONTAINING ALCOHOL: 5
TOTAL SCORE: 1
AUDITORY DISTURBANCES: NOT PRESENT
TOTAL SCORE: 5
VISUAL DISTURBANCES: NOT PRESENT
AGITATION: NORMAL ACTIVITY
TREMOR: *
ANXIETY: MILDLY ANXIOUS
AGITATION: SOMEWHAT MORE THAN NORMAL ACTIVITY
DO YOU DRINK ALCOHOL: YES
TREMOR: *
VISUAL DISTURBANCES: NOT PRESENT
TOTAL SCORE: 5
AGITATION: NORMAL ACTIVITY
PAROXYSMAL SWEATS: NO SWEAT VISIBLE
VISUAL DISTURBANCES: NOT PRESENT
TOTAL SCORE: 1
NAUSEA AND VOMITING: NO NAUSEA AND NO VOMITING
TOTAL SCORE: 5
CONSUMPTION TOTAL: POSITIVE
ANXIETY: *
AUDITORY DISTURBANCES: NOT PRESENT
ORIENTATION AND CLOUDING OF SENSORIUM: ORIENTED AND CAN DO SERIAL ADDITIONS
HEADACHE, FULLNESS IN HEAD: NOT PRESENT
ON A TYPICAL DAY WHEN YOU DRINK ALCOHOL HOW MANY DRINKS DO YOU HAVE: 2
TREMOR: *
EVER FELT BAD OR GUILTY ABOUT YOUR DRINKING: NO
NAUSEA AND VOMITING: NO NAUSEA AND NO VOMITING
ORIENTATION AND CLOUDING OF SENSORIUM: ORIENTED AND CAN DO SERIAL ADDITIONS
AUDITORY DISTURBANCES: NOT PRESENT
TREMOR: *
AGITATION: SOMEWHAT MORE THAN NORMAL ACTIVITY
ANXIETY: *
ANXIETY: *
HEADACHE, FULLNESS IN HEAD: NOT PRESENT
PAROXYSMAL SWEATS: NO SWEAT VISIBLE
VISUAL DISTURBANCES: NOT PRESENT
HAVE YOU EVER FELT YOU SHOULD CUT DOWN ON YOUR DRINKING: YES
PAROXYSMAL SWEATS: NO SWEAT VISIBLE
TOTAL SCORE: 3
ORIENTATION AND CLOUDING OF SENSORIUM: ORIENTED AND CAN DO SERIAL ADDITIONS
NAUSEA AND VOMITING: NO NAUSEA AND NO VOMITING
HEADACHE, FULLNESS IN HEAD: NOT PRESENT
AUDITORY DISTURBANCES: NOT PRESENT
NAUSEA AND VOMITING: NO NAUSEA AND NO VOMITING
PAROXYSMAL SWEATS: NO SWEAT VISIBLE
TOTAL SCORE: 1
ORIENTATION AND CLOUDING OF SENSORIUM: ORIENTED AND CAN DO SERIAL ADDITIONS
HOW MANY TIMES IN THE PAST YEAR HAVE YOU HAD 5 OR MORE DRINKS IN A DAY: 0
HEADACHE, FULLNESS IN HEAD: NOT PRESENT
EVER HAD A DRINK FIRST THING IN THE MORNING TO STEADY YOUR NERVES TO GET RID OF A HANGOVER: NO

## 2022-11-12 ASSESSMENT — PAIN DESCRIPTION - PAIN TYPE
TYPE: ACUTE PAIN;SURGICAL PAIN
TYPE: SURGICAL PAIN

## 2022-11-12 NOTE — CARE PLAN
The patient is Stable - Low risk of patient condition declining or worsening    Shift Goals  Clinical Goals: Pain management, safety, mobility  Patient Goals: pain control, call RN/CNA if pt needs to stand  Family Goals: Not present    Progress made toward(s) clinical / shift goals:       Patient is not progressing towards the following goals:      Problem: Knowledge Deficit - Standard  Goal: Patient and family/care givers will demonstrate understanding of plan of care, disease process/condition, diagnostic tests and medications  Outcome: Progressing     Problem: Pain - Standard  Goal: Alleviation of pain or a reduction in pain to the patient’s comfort goal  Outcome: Progressing     Problem: Fall Risk  Goal: Patient will remain free from falls  Outcome: Progressing     Problem: Skin Integrity  Goal: Skin integrity is maintained or improved  Outcome: Progressing

## 2022-11-12 NOTE — ASSESSMENT & PLAN NOTE
Noted to be hypotensive, thus Cortisol at 0.6   -- will provide hydrocortisone 20 at am and 10 at pm  BP improving

## 2022-11-12 NOTE — DISCHARGE PLANNING
Care Transition Team Assessment    Met with patient at bedside, discussed PLOF and confirmed facesheet contact information. Patient reported that she lives with her  in a single story apt in a senior complex. 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 Patient had difficulty staying on track with conversation and had to be redirected several times, some tremors noted. Her  continued to call her during the conversation. Has a daughter who lives in [place] and can drive her home. PT re-eval pending. Patient is concerned about finances and hospital stay costs. Lives in [facility] is closest agency to her home. CM will continue to monitor.     Information Source  Orientation Level: Oriented X4  Elopement Risk  Legal Hold: No  Ambulatory or Self Mobile in Wheelchair: No-Not an Elopement Risk  Elopement Risk: Not at Risk for Elopement    Interdisciplinary Discharge Planning  Lives with - Patient's Self Care Capacity: Spouse  Patient or legal guardian wants to designate a caregiver: No  Support Systems:  (Daughter lives in [place], patient is caregiver for , some neighbours can help out.)  Housing / Facility: 1 Story Apartment / Condo  Able to Return to Previous ADL's: Future Time w/Therapy  Mobility Issues: No  Prior Services: None  Durable Medical Equipment:  (raised toilet seat, shower bench, grab bars.)  Discharge Preparedness  What is your plan after discharge?: Home health care  What are your discharge supports?: Spouse, Child  Prior Functional Level: Independent with Activities of Daily Living  Difficulity with ADLs: None  Difficulity with IADLs: Driving  Difficulity with IADL Comments:  drives locally, housing complex provides transport during the week.     Functional Assesment  Prior Functional Level: Independent with Activities of Daily Living    Vision / Hearing Impairment  Vision Impairment : Yes  Right Eye Vision: Impaired, Wears Glasses  Left Eye Vision:  Impaired, Wears Glasses  Hearing Impairment : No   Domestic Abuse  Have you ever been the victim of abuse or violence?: No  Physical Abuse or Sexual Abuse: No  Verbal Abuse or Emotional Abuse: No  Possible Abuse/Neglect Reported to:: Not Applicable  Anticipated Discharge Information  Discharge Disposition: D/T to home under HHA care in anticipation of covered skilled care (06)

## 2022-11-12 NOTE — ASSESSMENT & PLAN NOTE
4/27/2021         Leroy Fajardo  [address]  [place] [address]      Dear Leroy,    This is a reminder that you have been scheduled for a [employer]onic Dual Pacemaker Implant with Magdiel Melendez M.D. on Friday, 5/14/21. Please arrive at the following location at 12:00 PM. Your procedure is scheduled for 2:00 PM.    Your procedure is scheduled at:  [address] [address]  [phone]    *Enter through the main entrance of hospital and take the glass elevators to the 3rd floor. When exiting the elevator turn right and follow the hallway until you see the Cardiac Services desk on your left hand side. Check in at the Cardiac Services desk.     COVID-19 testing prior to procedure:    · New COVID-19 guidelines require patients scheduled for surgeries and procedures scheduled with anesthesia must be tested for COVID-19 between 36-72 hours prior to your planned EP procedure. We have placed an order for a COVID-19 test to be done on Tuesday, 5/11/21 at 10:00 AM  at [facility].   · Please be aware that you must quarantine at home for the time you have been tested until your scheduled surgery/procedure. Your surgery/procedure may be rescheduled based on test results. Our EP office staff will contact you if your test is positive, expect at least a 24 hour turn around time.    Your pre-procedure instructions are as follows:    · Use the Hibiclens soap for three days prior to the procedure. Use one packet daily on 5/11/21, 5/12/21 and 5/13/21. Please see the attached instructional sheet.  · Do not have anything to eat or drink after midnight the night prior to your procedure.  · Do not take Eliquis (blood thinner) the entire day prior or morning of your procedure.  · You may take all other prescription medications with a sip of water the morning of the procedure.  · Do not take any Vitamins or Supplements the morning of the  At 47, monitor   Cbc tomorrow    procedure.            · If you have had any recent medication changes, especially to diabetic medications or blood thinners, please call our office to insure the accuracy of your pre-procedure instructions.  · DUE TO COVID-19 you and your accompanying family member will be screened for symptoms PRIOR to being allowed into the hospital. PLEASE CALL OUR OFFICE PRIOR TO YOUR PROCEDURE IF YOU ARE EXPERIENCING RESPIRATORY SYMPTOMS (ex/ SHORTNESS OF BREATH OR A DRY COUGH), FEVER GREATER THAN 100 F OR IF YOU HAVE BEEN DIRECTLY EXPOSED TO ANYONE WHO HAS BEEN POSITIVE FOR COVID-19, even if you have already been tested. At this time 1 family member or support person is allowed to enter the hospital with you at the time of your procedure, but please be aware this is subject to change at any time.   · You are tentatively scheduled to discharge from the hospital the same day. However, please plan for both the possibility of an overnight stay in the hospital and for the possibility of going home the same day by having a ride available both on the day of your procedure and on the following day. After your procedure, Dr Magdiel Melendez M.D. will evaluate the necessity of an overnight stay and may make the decision to send you home the same day if appropriate. Due to the sedation you will receive for your procedure, you will not be able to drive for 48 hours. If you live alone, please plan to have someone available to either stay with you the night of your procedure or to be easily reachable by phone.     *A nurse from pre/post cardiac services will be giving you a call prior (typically 1-3 days before) to your procedure to review these instructions with you.     On the day of the procedure:     • Please bring your insurance card(s) and a list of your medications and/or vitamins supplements. Please include the dosage information for each medication and/or vitamin. Please do not bring your actual pill bottles.   • IF you are  receiving a device implant: If you have a smart phone, please bring your smart phone with you to the hospital as you may need it to pair with your device. Please make sure you have e-mail access available on your phone and your device's elijah store password. You will need this because you will be asked to download the appropriate elijah and sign up for account access for you device.  • If you have sleep apnea, please bring your CPAP/BIPAP machine with you.    Follow up:    • An appointment has been scheduled for you at the [facility] to see a Device Nurse on Monday, 5/24/21 at 10:00 AM.  • An appointment has been scheduled for you at the [facility] to see a Dr Avendano on Thursday, 9/2/21 at 12:15 PM.    If you are unable to keep either of these appointments, please contact the office at [phone] to reschedule.            Thank you in advance for taking the time to review and complete these instructions.    If you have any questions regarding your procedure, please contact our office.    Sincerely,    Erma, Clinical   [phone]    Reviewed by: Ruthy Oneill RN    [facility]   [address] [id number]   Phone:  [phone]    Fax:  [phone] [facility]  [address] [place] [address]  Phone:  [phone]  Fax:  [phone]       [place] [address]  Phone:  [phone]   Fax:  [phone] [facility]   [id number] [place] [address]  Phone:  [phone]   Fax:  [phone]       Plan for the following activity restrictions after your device implant: Do not raise your arm above your head (on the side of your device).  You should keep your elbow below the level of your shoulder until your first appointment.  Please consider bringing either a button down or zip up top to wear home from  the hospital due to the arm restrictions you will have after the procedure. You may find it difficult to put on shirts that you have to pull over your head (ex/ t-shirts, especially tighter fitting ones) after your procedure. Do not lift, pull or push anything heavier than 10 pounds on the side of your implant for 2 weeks. No repetitive arm motions on the affected side (examples: golfing, vacuuming) for 4-6 weeks. You should be able to slowly resume your daily activities as tolerated.  You may not drive sooner than 1 week.  When you do drive, be sure the incision is well padded/ protected from the safety shoulder restraint. You may have been offered an arm immobilizer to help follow the activity restrictions while you sleep.                                Pacemakers    A pacemaker is a small electronic device that keeps your heart beating at the right pace. Inserting the pacemaker into your body is called implantation.    How do I get ready for a pacemaker procedure?  · Follow any directions you are given for not eating or drinking before surgery.  · Tell your doctor about any allergies to medicines, shellfish or iodine contrast, tape or adhesives, or antibiotic soaps. Other options can be provided and precautions can be taken to avoid exposing you to anything that you are allergic to.  · Follow your healthcare provider's instructions on what medicines to take. If you are taking a blood thinning medicines, your healthcare provider may tell you to stop it before the procedure. This is to decrease the risk of major bleeding. You may be told to stop medicines that interact with the contrast dye that is used during the procedure.  · You may be asked to shower with antibacterial soap the night before your procedure and the morning of the procedure. Ask your provider what kind of soap to use.  · Your provider may ask you to use clean bed sheets and pajamas the night before the procedure. This can reduce the risk of  infection.  · You may be given an antibiotic through an IV (intravenous) to also protect you from infection after the implant procedure.  · You will have blood drawn depending on your overall health. If your kidneys are not healthy , special care may be needed before the procedure.  · If you are a woman of child-bearing age you may be asked to take a pregnancy test before the procedure.     What happens during the procedure?  · Your doctor may prescribe a medicine to help you relax and to prevent pain during the procedure.  · An IV line will be placed in the arm on the same side of the procedure site.  · A local anesthetic is given by injection to numb the area where the pacemaker will be inserted. This keeps you from feeling pain during the procedure.  · The doctor will make a cut (incision) where the generator is placed.            · The doctor will guide the wire (lead) through a vein into your heart’s chambers using X-ray monitors.  · The doctor will attach the pacemaker generator to the lead or leads and test the device  · The doctor will close the incision site with stitches. He or she may also seal the site with a surgical glue to prevent infection.  · A dressing may be applied to your incision. This is to reduce the risk of bleeding and protect it from infection.  · The pacemaker’s settings are programmed to help your heart beat at a rate that’s right for you.     What happens after the procedure?  · You will have a chest X-ray while you are in the recovery area.  · Your pacemaker settings will be rechecked.  · Your provider may prescribe antibiotics to take after the procedure to prevent infection.  · Follow the instructions you are given for caring for the implantation site. You may be told not to raise the arm on that side for a certain period. You may have a sling to keep you from moving your arm on the incision side. This is done to prevent the new pacemaker wire from becoming displaced. Your doctor  will tell you how long and when you should wear this.  · Take your temperature and check your incision for signs of infection every day for a week.  · Return for a follow-up visit as advised.  · As your healthcare provider when it will be safe to shower, bathe, or swim. Generally, you should not soak in water for about a week to prevent the incision from softening, opening, or becoming infected.  · Don't do any activities that would put pressure on your incision site or cause irritation to the incision. Don't use lotions, powders, or ointments unless your provider says it's OK. Don't carry a purse or backpack that would put pressure on your incision site.     When to call your healthcare provider  Call 911 if you have:  · Chest pain  · Severe trouble breathing  Call your healthcare provider right away if you have any of the following:  · You feel any of the symptoms you had before the pacemaker was implanted. These include dizziness, lightheadedness, lack of energy, or fainting spells.  · Your chest or abdominal muscles twitch  · You have hiccups that won't stop  · You have a rapid or pounding heartbeat or shortness of breath  · You feel pain in the area around your pacemaker  · You have a fever of 100.4°F (38°C) or higher, or as directed by your healthcare provider  · Redness, severe swelling, drainage, bleeding, or warmth at the incision site  · Your incision site is not healing or it separates or opens          · Your pacemaker generator feels loose or like it is wiggling in the pocket under the skin  · If you need an MRI for any reason. In some cases, it's not safe to have an MRI with a pacemaker.    [employer] last reviewed this educational content on 5/1/2019 © 2000-2020 [employer]. [address] [place] [address], [place] [address]. All rights reserved. This information is not intended as a substitute for professional medical care. Always follow your healthcare professional's  instructions.             The device (Permanent Pacemaker or Implantable Cardiac Defibrillator) will be used to send regular transmissions to our office for monitoring which will result in additional financial charges.

## 2022-11-12 NOTE — THERAPY
Physical Therapy   Initial Evaluation     Patient Name: Rosalva Hunter  Age:  66 y.o., Sex:  female  Medical Record #: [id number]  Today's Date: 11/11/2022     Precautions  Precautions: Weight Bearing As Tolerated Right Lower Extremity    Assessment  Patient is 66 y.o. female presenting to physical therapy s/p R femur IM nailing; WBAT. Pt received in hallway ambulating with OT. Demonstrated steady gait with FWW with progressive improvement in gait mechanics as ambulation continued. Tolerated ambulation x200 ft total with PT, reported no concern with threshold step entry into home. Pt with no further acute PT needs at this time. Continue to ambulate with nursing staff 2-3x/day and up to chair for all meals.     Plan    Recommend Physical Therapy for Evaluation only     DC Equipment Recommendations: Front-Wheel Walker  Discharge Recommendations: Recommend home health for continued physical therapy services (if available in her area ([place]) otherwise anticipate no needs or OP PT (again if available))        11/11/22 5534   Precautions   Precautions Weight Bearing As Tolerated Right Lower Extremity   Vitals   O2 Delivery Device None - Room Air   Pain 0 - 10 Group   Therapist Pain Assessment During Activity;Nurse Notified  (very minimal pain)   Prior Living Situation   Prior Services Home-Independent   Housing / Facility 1 Story House   Steps Into Home   (threshold entry)   Equipment Owned Tub Transfer Bench;Grab Bar(s) In Tub / Shower;Grab Bar(s) By Toilet   Lives with - Patient's Self Care Capacity Spouse   Comments 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, live in handicap accessible home   Prior Level of Functional Mobility   Bed Mobility Independent   Transfer Status Independent   Ambulation Independent   Distance Ambulation (Feet)   (community)   Assistive Devices Used None   Stairs Independent   Cognition    Level of Consciousness Alert   Comments appeared anxious but receptive   Strength Lower Body   Lower Body  Strength  X   Comments R LE limited by post op pain but functional   Balance Assessment   Sitting Balance (Static) Fair +   Sitting Balance (Dynamic) Fair +   Standing Balance (Static) Fair   Standing Balance (Dynamic) Fair   Weight Shift Sitting Fair   Weight Shift Standing Fair   Comments w/ FWW   Gait Analysis   Gait Level Of Assist Standby Assist   Assistive Device Front Wheel Walker   Distance (Feet) 200   # of Times Distance was Traveled 1   Deviation Bradykinetic;Antalgic  (decreaesd step length)   # of Stairs Climbed 0   Weight Bearing Status no restrictions   Comments pt reports no concern with threshold step to enter home   Bed Mobility    Sit to Supine Standby Assist   Scooting Supervised   Functional Mobility   Bed, Chair, Wheelchair Transfer Standby Assist   Toilet Transfers Standby Assist   How much difficulty does the patient currently have...   Turning over in bed (including adjusting bedclothes, sheets and blankets)? 3   Sitting down on and standing up from a chair with arms (e.g., wheelchair, bedside commode, etc.) 3   Moving from lying on back to sitting on the side of the bed? 3   How much help from another person does the patient currently need...   Moving to and from a bed to a chair (including a wheelchair)? 3   Need to walk in a hospital room? 3   Climbing 3-5 steps with a railing? 3   6 clicks Mobility Score 18   Problem List    Problems Pain;Impaired Ambulation   Anticipated Discharge Equipment and Recommendations   DC Equipment Recommendations Front-Wheel Walker   Discharge Recommendations Recommend home health for continued physical therapy services  (if available in her area ([place]) otherwise anticipate no needs or OP PT (again if available))   Interdisciplinary Plan of Care Collaboration   IDT Collaboration with  Nursing;Occupational Therapist   Patient Position at End of Therapy In Bed;Call Light within Reach;Tray Table within Reach   Collaboration Comments RN aware of PT session    Session Information   Date / Session Number  11/11-1 (eval only)

## 2022-11-12 NOTE — CARE PLAN
" My left arm has been hurting x 2 days, my chest started hurting this am " The patient is Stable - Low risk of patient condition declining or worsening    Shift Goals  Clinical Goals: pain control, mobility, safety, Abdominal US  Patient Goals: pain control, Abdominal US, comfort  Family Goals: no family member    Progress made toward(s) clinical / shift goals:    Problem: Knowledge Deficit - Standard  Goal: Patient and family/care givers will demonstrate understanding of plan of care, disease process/condition, diagnostic tests and medications  Outcome: Progressing  Note: POC discussed with the patient. PT re beaual.  Test - Abdominal US.  Questions answered. Verbalized understanding.     Problem: Pain - Standard  Goal: Alleviation of pain or a reduction in pain to the patient’s comfort goal  Outcome: Progressing  Note: Educated on pain scale. Encouraged to verbalize pain. Will medicare as per MAR. Ice pack prn.     Problem: Fall Risk  Goal: Patient will remain free from falls  Outcome: Progressing  Note: Fall protocol in effect. Call light within reach. Reminded patient to call for assist. Hourly rounds in effect. Kept room clutter free. Bed alarm on and bed in lowest position. Verbalized understanding.     Problem: Optimal Care for Alcohol Withdrawal  Goal: Optimal Care for the alcohol withdrawal patient  Outcome: Progressing  Note: Patient's on CIWA Protocol. Medicated with Ativan per CIWA Protocol. Will monitor.        Patient is not progressing towards the following goals:

## 2022-11-12 NOTE — PROGRESS NOTES
"   Orthopaedic PA Progress Note    Interval changes:Did well today    ROS - Patient denies any new issues. No chest pain, dyspnea, or fever.  Pain well controlled.    BP (!) 95/66   Pulse 74   Temp 37 °C (98.6 °F) (Temporal)   Resp 17   Ht 1.676 m (5' 6\")   Wt 64.1 kg (141 lb 5 oz)   SpO2 97%     Patient seen and examined  No acute distress  Breathing non labored  RRR  Surgical dressing is clean, dry, and intact. Patient clearly fires tibialis anterior, EHL, and gastrocnemius/soleus. Sensation is intact to light touch throughout superficial peroneal, deep peroneal, tibial, saphenous, and sural nerve distributions. Strong and palpable 2+ dorsalis pedis and posterior tibial pulses with capillary refill less than 2 seconds. No lower leg tenderness or discomfort.    Recent Labs     11/10/22  0438 11/11/22  0249   WBC 5.1 4.8   RBC 3.41* 2.64*   HEMOGLOBIN 12.7 9.8*   HEMATOCRIT 36.8* 29.5*   .9* 111.7*   MCH 37.2* 37.1*   MCHC 34.5 33.2*   RDW 50.4* 51.0*   PLATELETCT 61* 40*   MPV 10.2 10.7       Active Hospital Problems    Diagnosis     Thrombocytopenia ([facility]) [D69.6]     Anemia [D64.9]     Hip fracture, right, closed, initial encounter ([facility]) [S72.001A]     Right hip pain [M25.551]     Alcohol dependence ([facility]) [F10.20]     Hypertension [I10]     Fall [W19.XXXA]     Intertrochanteric fracture of right hip, closed, initial encounter ([facility]) [S72.141A]        Assessment/Plan:  POD#1 S/P R hip IMN  Wt bearing status - AT  PT/OT-initiated  Wound care:dressings left in place  Drains - no  Melendez-no  Sutures/Staples out- 10-14 days post operatively  Antibiotics: completed  DVT Prophylaxis- TEDS/SCDs/Foot pumps.   Lovenox: Start 40 mg daily, Duration-until ambulatory > 150'  May transition to ASA at discharge  Future Procedures - none planned  Case Coordination for Discharge Planning - Disposition per Med  Follow-Up: needs appointment with Dr. Vu at [facility] at 10-14 days post-op for " re-evaluation, staple removal and radiographs.

## 2022-11-12 NOTE — THERAPY
Physical Therapy   Re-Evaluation     Patient Name: Rosalva Hunter  Age:  66 y.o., Sex:  female  Medical Record #: [id number]  Today's Date: 11/12/2022     Precautions  Precautions: Weight Bearing As Tolerated Right Lower Extremity    Assessment  Patient is 66 y.o. female with a diagnosis of right femur fracture after tripping over her husbands supplemental O2 cord. Pt underwent right femur IM nailing , WBAT .  PT re-evaluaton per MD request for possible need for SNF placement.   Pt seen for mobility assessment, home safety education and DME recommendations. Pt appears to be a functional mobility level adaquate for home with home care services as previously recommended. Extensive education given on home safety and fall prevention techniques Instructed in fall prevention measures including use AD at all times in home and scooter when exiting home; use of night light; proper foot wear and keeping walkways clutter free. Good verbal return. Also instructed in use of 0 to 10 exertional scale to determine appropriate activity level during ADLs/exercise/ambulation to prevent excessive fatigue and increased fall risk.Verbalized understanding.      Plan    Recommend Physical Therapy for Evaluation only Patient will not be actively followed for physical therapy services at this time, however may be seen if requested by physician for 1 more visit within 30 days to address any discharge or equipment needs.     DC Equipment Recommendations: Front-Wheel Walker  Discharge Recommendations: Recommend home health for continued physical therapy services            Objective       11/12/22 1113   Precautions   Precautions Weight Bearing As Tolerated Right Lower Extremity   Vitals   O2 (LPM) 0   O2 Delivery Device None - Room Air   Pain   Intervention Cold Pack;Rest;Repositioned   Pain 0 - 10 Group   Location Hip   Location Orientation Right   Pain Rating Scale (NPRS) 2   Description Dull   Therapist Pain Assessment Post Activity Pain  Same as Prior to Activity;Nurse Notified;2   Cognition    Level of Consciousness Alert   Comments pleasant and cooperative. Able to supply home history and prior level of function. Able to recall reason for GLF, verbalized home safety techniques including removal of throw rugs and O2 cord placement. Uses scooter for outdoor mobility to store.   Active ROM Lower Body    Active ROM Lower Body  WDL   Comments right hip limited by pain but functional   Strength Lower Body   Comments right hip limited by pain but functional for mobility .   Sensation Lower Body   Lower Extremity Sensation   WDL   Lower Body Muscle Tone   Lower Body Muscle Tone  WDL   Vision   Vision Comments no c/o visual changes   Neurological Concerns   Neurological Concerns No   Balance   Sitting Balance (Static) Good   Sitting Balance (Dynamic) Fair +   Standing Balance (Static) Fair +   Standing Balance (Dynamic) Fair +   Weight Shift Sitting Good   Weight Shift Standing Fair   Skilled Intervention Verbal Cuing   Comments w/FWW   Gait Analysis   Gait Level Of Assist Standby Assist   Assistive Device Front Wheel Walker   Distance (Feet) 200   # of Times Distance was Traveled 1   Deviation Bradykinetic;Antalgic   Weight Bearing Status no restrictions   Skilled Intervention Verbal Cuing   Comments no LOB, mild path deviation to right, cues to correct. Heavy reliance on FWW for support   Bed Mobility    Supine to Sit Supervised   Sit to Supine Supervised   Scooting Supervised   Skilled Intervention Sequencing   Functional Mobility   Sit to Stand Standby Assist   Bed, Chair, Wheelchair Transfer Standby Assist  (cues for sequencing, pt abandoned FWW and grasped armrests of chair .)   Toilet Transfers Standby Assist   Transfer Method Stand Step   Skilled Intervention Sequencing;Verbal Cuing   How much difficulty does the patient currently have...   Turning over in bed (including adjusting bedclothes, sheets and blankets)? 3   Sitting down on and standing  up from a chair with arms (e.g., wheelchair, bedside commode, etc.) 3   Moving from lying on back to sitting on the side of the bed? 3   How much help from another person does the patient currently need...   Moving to and from a bed to a chair (including a wheelchair)? 3   Need to walk in a hospital room? 3   Climbing 3-5 steps with a railing? 3   6 clicks Mobility Score 18   Activity Tolerance   Sitting in Chair functional   Sitting Edge of Bed functional   Standing 5 min   Education Group   Education Provided Role of Physical Therapist;Gait Training;Use of Assistive Device;Transfer Status   Role of Physical Therapist Patient Response Patient;Acceptance;Explanation;Verbal Demonstration   Gait Training Patient Response Patient;Acceptance;Explanation;Action Demonstration   Use of Assistive Device Patient Response Patient;Acceptance;Explanation;Action Demonstration   Transfer Status Patient Response Patient;Acceptance;Explanation;Demonstration;Action Demonstration;Verbal Demonstration   Anticipated Discharge Equipment and Recommendations   DC Equipment Recommendations Front-Wheel Walker   Discharge Recommendations Recommend home health for continued physical therapy services   Interdisciplinary Plan of Care Collaboration   IDT Collaboration with  Nursing;Physician;Physician Assistant   Patient Position at End of Therapy Seated;Tray Table within Reach;Call Light within Reach;Phone within Reach   Collaboration Comments RN and MD updated on recommendations

## 2022-11-12 NOTE — FACE TO FACE
Face to Face Supporting Documentation - Home Health    The encounter with this patient was in whole or in part the primary reason for home health admission.    Date of encounter:   Patient:                    MRN:                       YOB: 2021  Rosalva Hunter  [id number]  1955     Home health to see patient for:  Skilled Nursing care for assessment, interventions & education, Physical Therapy evaluation and treatment, and Occupational therapy evaluation and treatment    Skilled need for:  Medication Management med management     Skilled nursing interventions to include:  Comment: med management     Homebound status evidenced by:  Need the aid of supportive devices such as crutches, canes, wheelchairs or walkers. Leaving home requires a considerable and taxing effort. There is a normal inability to leave the home.\  Community Physician to provide follow up care: No primary care provider on file.     Optional Interventions? No      I certify the face to face encounter for this home health care referral meets the CMS requirements and the encounter/clinical assessment with the patient was, in whole, or in part, for the medical condition(s) listed above, which is the primary reason for home health care. Based on my clinical findings: the service(s) are medically necessary, support the need for home health care, and the homebound criteria are met.  I certify that this patient has had a face to face encounter by myself.  Nakia Zaman M.D. - NPI: [id number]

## 2022-11-12 NOTE — DISCHARGE PLANNING
Received Choice form at [phone]  Agency/Facility Name: [facility]  Referral sent per Choice form @ [phone]

## 2022-11-12 NOTE — PROGRESS NOTES
[facility] Medicine Daily Progress Note    Date of Service  11/12/2022    Chief Complaint  Rosalva Hunter is a 66 y.o. female admitted 11/9/2022 with right hip pain after GLF     Hospital Course      Interval Problem Update  Patient seen and examined, afebrile, had surgical repair with ortho this morning, afebrile   Pain management   PT/OT eval     11/11:  -- No acute events overnight, patient has been stable, heart rate 71-90, blood pressure 109/82, saturating 98% on room air.  Patient is alert and oriented, answering questions appropriately.  --Underwent surgery yesterday, PT/OT has evaluate the patient, recommended a walker at home.  --Noted to have low platelets at 40, will obtain ultrasound of the abdomen, daily LDH, B12, patient with abdomen, HIV, hepatitis panel  --Was noted to be at 131, monitor.  Patient noted to have metastatic anemia, pending vitamin B12, TSH.    11/12:  -- No acute events overnight, medicine has been stable other than being hypertensive with systolic blood pressure 92/82, SERUM cortisol level is noted to be low at 0.6, patient was started on 20 mg p.o., hydrocortisone at a.m., 10 at PM.    -- Potassium on the higher side at 5.4, monitor, avoid orange juice  --She did not wanted to monitor.  Patient is noted to be in mild PAPA, continue IV fluid, repeat BMP at a.m.  Management 1.5, will provide 2 g of IV magnesium.  Patient noted to have pancytopenia likely secondary to bone marrow suppression from alcohol--      I have discussed this patient's plan of care and discharge plan at IDT rounds today with Case Management, Nursing, Nursing leadership, and other members of the IDT team.    Consultants/Specialty  orthopedics    Code Status  Full Code    Disposition  Patient is not medically cleared for discharge.   Anticipate discharge to  TBD  .  I have placed the appropriate orders for post-discharge needs.    Review of Systems  Review of Systems   Constitutional:  Positive for malaise/fatigue.  Negative for fever.   HENT:  Positive for congestion. Negative for sore throat.    Eyes:  Negative for blurred vision and double vision.   Respiratory:  Negative for cough and hemoptysis.    Cardiovascular:  Negative for chest pain, palpitations and orthopnea.   Gastrointestinal:  Negative for abdominal pain, heartburn, nausea and vomiting.   Genitourinary:  Negative for dysuria.   Musculoskeletal:  Positive for joint pain and myalgias.   Neurological:  Negative for focal weakness and headaches.   Psychiatric/Behavioral:  The patient is nervous/anxious.    All other systems reviewed and are negative.     Physical Exam  Temp:  [36.1 °C (96.9 °F)-37 °C (98.6 °F)] 36.2 °C (97.2 °F)  Pulse:  [74-90] 74  Resp:  [16-17] 17  BP: ()/(56-72) 109/72  SpO2:  [92 %-97 %] 96 %    Physical Exam  Vitals and nursing note reviewed.   Constitutional:       Appearance: Normal appearance.   HENT:      Head: Normocephalic and atraumatic.   Eyes:      Extraocular Movements: Extraocular movements intact.      Pupils: Pupils are equal, round, and reactive to light.   Cardiovascular:      Rate and Rhythm: Normal rate.   Pulmonary:      Effort: No respiratory distress.      Breath sounds: Normal breath sounds.   Abdominal:      General: There is no distension.      Palpations: Abdomen is soft.   Musculoskeletal:      Cervical back: Neck supple.      Comments: Decreased rom 2/2 pain    Skin:     General: Skin is warm.   Neurological:      Mental Status: She is alert and oriented to person, place, and time.      Cranial Nerves: No cranial nerve deficit.       Fluids    Intake/Output Summary (Last 24 hours) at 11/12/2022 1503  Last data filed at 11/12/2022 1340  Gross per 24 hour   Intake 720 ml   Output 1300 ml   Net -580 ml         Laboratory  Recent Labs     11/10/22  0438 11/11/22  0249 11/12/22  0544   WBC 5.1 4.8 4.5*   RBC 3.41* 2.64* 2.67*   HEMOGLOBIN 12.7 9.8* 10.0*   HEMATOCRIT 36.8* 29.5* 29.7*   .9* 111.7* 111.2*   MCH  37.2* 37.1* 37.5*   MCHC 34.5 33.2* 33.7   RDW 50.4* 51.0* 52.0*   PLATELETCT 61* 40* 52*   MPV 10.2 10.7 10.6       Recent Labs     11/10/22  0438 11/11/22  0249 11/12/22  0544   SODIUM 134* 131* 132*   POTASSIUM 5.0 5.5 5.4   CHLORIDE 101 102 104   CO2 21 21 20   GLUCOSE 119* 160* 124*   BUN 33* 36* 44*   CREATININE 1.19 1.15 1.25   CALCIUM 9.4 8.6 8.7       Recent Labs     11/10/22  0438   INR 1.08                 Imaging  US-ABDOMEN COMPLETE SURVEY   Final Result      Cirrhosis with small ascites      Moderate hepatosplenomegaly      Renal cortical thinning compatible with some atrophy      Simple left hepatic cyst      Cholelithiasis with some pericholecystic fluid and mild gallbladder wall thickening. The wall thickening is more likely related to hepatocellular disease than acute cholecystitis but the latter cannot be excluded and clinical correlation is recommended.    Hepatobiliary scan could be obtained to further evaluate if indicated      DX-PORTABLE FLUOROSCOPY < 1 HOUR Is the patient pregnant? Unknown   Final Result      Portable fluoroscopy utilized for 20 seconds.         INTERPRETING LOCATION: [address] [place] [address], [address]      DX-HIP-UNILATERAL-W/O PELVIS-2/3 VIEWS RIGHT   Final Result      Portable fluoroscopy as described.      [id number] FILM X-RAY   Final Result             Assessment/Plan  * Hip fracture, right, closed, initial encounter (HCC)  Assessment & Plan  S/p GLF  Fracture noted on x-ray  Supportive pain control  Ortho consulted and surgical repair on 11/10   -- pt and ot recs home with home health   -- Multimodal pain management     Pancytopenia (HCC)  Assessment & Plan  CBC daily, likely secondary to bone marrow suppression from alcohol    PAPA (acute kidney injury) (HCC)  Assessment & Plan  Likely pre-renal in etiology   Continue ivf    Adrenal insufficiency (HCC)  Assessment & Plan  Noted to be hypotensive, thus Cortisol at 0.6   -- will provide hydrocortisone 20 at am and 10  at pm      Anemia  Assessment & Plan  Normal vitamin b12 and tsh       Thrombocytopenia (HCC)  Assessment & Plan  At 52, monitor   Cbc tomorrow     Intertrochanteric fracture of right hip, closed, initial encounter ([facility])- (present on admission)  Assessment & Plan  As above    Fall  Assessment & Plan  Fall precautions    Hypertension  Assessment & Plan  Now  Hypotensive, likely 2/2 adrenal  insufficiency    Alcohol dependence ([facility])  Assessment & Plan  CIWA protocol   Po thaimine and folic acid    monitor for withdrawal     Right hip pain  Assessment & Plan  As above       VTE prophylaxis: scd    Thrombocytopenia, no chemical ppx     I have performed a physical exam and reviewed and updated ROS and Plan today (11/12/2022). In review of yesterday's note (11/11/2022), there are no changes except as documented above.

## 2022-11-12 NOTE — DISCHARGE PLANNING
HH and DME choices complete with patient.   Lives in [facility] HH nearest agency, FWW ordered for delivery to room. No discharge home today.

## 2022-11-12 NOTE — PROGRESS NOTES
"   Orthopaedic PA Progress Note    Interval changes:Did well overnight.    ROS - Patient denies any new issues. No chest pain, dyspnea, or fever.  Pain well controlled.    /72   Pulse 74   Temp 36.2 °C (97.2 °F) (Temporal)   Resp 17   Ht 1.676 m (5' 6\")   Wt 64.1 kg (141 lb 5 oz)   SpO2 96%     Patient seen and examined  No acute distress  Breathing non labored  RRR  Surgical dressing is clean, dry, and intact. Patient clearly fires tibialis anterior, EHL, and gastrocnemius/soleus. Sensation is intact to light touch throughout superficial peroneal, deep peroneal, tibial, saphenous, and sural nerve distributions. Strong and palpable 2+ dorsalis pedis and posterior tibial pulses with capillary refill less than 2 seconds. No lower leg tenderness or discomfort.    Recent Labs     11/10/22  0438 11/11/22  0249 11/12/22  0544   WBC 5.1 4.8 4.5*   RBC 3.41* 2.64* 2.67*   HEMOGLOBIN 12.7 9.8* 10.0*   HEMATOCRIT 36.8* 29.5* 29.7*   .9* 111.7* 111.2*   MCH 37.2* 37.1* 37.5*   MCHC 34.5 33.2* 33.7   RDW 50.4* 51.0* 52.0*   PLATELETCT 61* 40* 52*   MPV 10.2 10.7 10.6       Active Hospital Problems    Diagnosis     Adrenal insufficiency ([facility]) [E27.40]     PAPA (acute kidney injury) ([facility]) [N17.9]     Pancytopenia ([facility]) [D61.818]     Thrombocytopenia ([facility]) [D69.6]     Anemia [D64.9]     Hip fracture, right, closed, initial encounter ([facility]) [S72.001A]     Right hip pain [M25.551]     Alcohol dependence ([facility]) [F10.20]     Hypertension [I10]     Fall [W19.XXXA]     Intertrochanteric fracture of right hip, closed, initial encounter ([facility]) [S72.141A]        Assessment/Plan:  POD#2 S/P R hip IMN  Wt bearing status - AT  PT/OT-initiated  Wound care:dressings left in place  Drains - no  Melendez-no  Sutures/Staples out- 10-14 days post operatively  Antibiotics: completed  DVT Prophylaxis- TEDS/SCDs/Foot pumps.   Lovenox: Start 40 mg daily, Duration-until ambulatory > 150'  May transition to ASA at discharge  Future Procedures " - none planned  Case Coordination for Discharge Planning - Disposition per Med  Follow-Up: needs appointment with Dr. Vu at [facility] at 10-14 days post-op for re-evaluation, staple removal and radiographs.

## 2022-11-12 NOTE — PROGRESS NOTES
[facility] Medicine Daily Progress Note    Date of Service  11/11/2022    Chief Complaint  Rosalva Hunter is a 66 y.o. female admitted 11/9/2022 with right hip pain after GLF     Hospital Course      Interval Problem Update  Patient seen and examined, afebrile, had surgical repair with ortho this morning, afebrile   Pain management   PT/OT eval     11/11:  -- No acute events overnight, patient has been stable, heart rate 71-90, blood pressure 109/82, saturating 98% on room air.  Patient is alert and oriented, answering questions appropriately.  --Underwent surgery yesterday, PT/OT has evaluate the patient, recommended a walker at home.  --Noted to have low platelets at 40, will obtain ultrasound of the abdomen, daily LDH, B12, patient with abdomen, HIV, hepatitis panel  --Was noted to be at 131, monitor.  Patient noted to have metastatic anemia, pending vitamin B12, TSH.      I have discussed this patient's plan of care and discharge plan at IDT rounds today with Case Management, Nursing, Nursing leadership, and other members of the IDT team.    Consultants/Specialty  orthopedics    Code Status  Full Code    Disposition  Patient is not medically cleared for discharge.   Anticipate discharge to  TBD  .  I have placed the appropriate orders for post-discharge needs.    Review of Systems  Review of Systems   Constitutional:  Positive for malaise/fatigue. Negative for fever.   HENT:  Positive for congestion. Negative for sore throat.    Eyes:  Negative for blurred vision and double vision.   Respiratory:  Negative for cough and hemoptysis.    Cardiovascular:  Negative for chest pain, palpitations and orthopnea.   Gastrointestinal:  Negative for abdominal pain, heartburn, nausea and vomiting.   Genitourinary:  Negative for dysuria.   Musculoskeletal:  Positive for joint pain and myalgias.   Neurological:  Negative for focal weakness and headaches.   Psychiatric/Behavioral:  The patient is nervous/anxious.    All other  systems reviewed and are negative.     Physical Exam  Temp:  [35.9 °C (96.6 °F)-36.2 °C (97.1 °F)] 36.1 °C (97 °F)  Pulse:  [71-90] 80  Resp:  [16-18] 16  BP: ()/(49-66) 109/64  SpO2:  [91 %-99 %] 99 %    Physical Exam  Vitals and nursing note reviewed.   Constitutional:       Appearance: Normal appearance.   HENT:      Head: Normocephalic and atraumatic.   Eyes:      Extraocular Movements: Extraocular movements intact.      Pupils: Pupils are equal, round, and reactive to light.   Cardiovascular:      Rate and Rhythm: Normal rate.   Pulmonary:      Effort: No respiratory distress.      Breath sounds: Normal breath sounds.   Abdominal:      General: There is no distension.      Palpations: Abdomen is soft.   Musculoskeletal:      Cervical back: Neck supple.      Comments: Decreased rom 2/2 pain    Skin:     General: Skin is warm.   Neurological:      Mental Status: She is alert and oriented to person, place, and time.      Cranial Nerves: No cranial nerve deficit.       Fluids    Intake/Output Summary (Last 24 hours) at 11/11/2022 1603  Last data filed at 11/11/2022 1200  Gross per 24 hour   Intake 1200.24 ml   Output 1350 ml   Net -149.76 ml         Laboratory  Recent Labs     11/10/22  0438 11/11/22 0249   WBC 5.1 4.8   RBC 3.41* 2.64*   HEMOGLOBIN 12.7 9.8*   HEMATOCRIT 36.8* 29.5*   .9* 111.7*   MCH 37.2* 37.1*   MCHC 34.5 33.2*   RDW 50.4* 51.0*   PLATELETCT 61* 40*   MPV 10.2 10.7       Recent Labs     11/10/22  0438 11/11/22  0249   SODIUM 134* 131*   POTASSIUM 5.0 5.5   CHLORIDE 101 102   CO2 21 21   GLUCOSE 119* 160*   BUN 33* 36*   CREATININE 1.19 1.15   CALCIUM 9.4 8.6       Recent Labs     11/10/22  0438   INR 1.08                 Imaging  DX-PORTABLE FLUOROSCOPY < 1 HOUR Is the patient pregnant? Unknown   Final Result      Portable fluoroscopy utilized for 20 seconds.         INTERPRETING LOCATION: [address] [place] [address] [place], [address]      DX-HIP-UNILATERAL-W/O PELVIS-2/3 VIEWS RIGHT   Final  Result      Portable fluoroscopy as described.      LG-[id number]-[id number] FILM X-RAY   Final Result      US-ABDOMEN COMPLETE SURVEY    (Results Pending)          Assessment/Plan  * Hip fracture, right, closed, initial encounter ([facility])  Assessment & Plan  S/p GLF  Fracture noted on x-ray  Supportive pain control  Ortho consulted and surgical repair on 11/10   -- pt and ot recs home with home health   -- Multimodal pain management     Adrenal insufficiency ([facility])  Assessment & Plan  Noted to be hypotensive, thus Cortisol at 0.6   -- will provide hydrocortisone 20 at am and 10 at pm      Anemia  Assessment & Plan  Normal vitamin b12 and tsh       Thrombocytopenia ([facility])  Assessment & Plan  At 52, monitor   Cbc tomorrow     Intertrochanteric fracture of right hip, closed, initial encounter ([facility])- (present on admission)  Assessment & Plan  As above    Fall  Assessment & Plan  Fall precautions    Hypertension  Assessment & Plan  Now  Hypotensive, likely 2/2 adrenal  insufficiency    Alcohol dependence ([facility])  Assessment & Plan  CIWA protocol   Po thaimine and folic acid    monitor for withdrawal     Right hip pain  Assessment & Plan  As above       VTE prophylaxis: scd    Thrombocytopenia, no chemical ppx     I have performed a physical exam and reviewed and updated ROS and Plan today (11/12/2022). In review of yesterday's note (11/11/2022), there are no changes except as documented above.

## 2022-11-13 LAB
ALBUMIN SERPL BCP-MCNC: 3 G/DL (ref 3.2–4.9)
ALBUMIN/GLOB SERPL: 1 G/DL
ALP SERPL-CCNC: 124 U/L (ref 30–99)
ALT SERPL-CCNC: 17 U/L (ref 2–50)
ANION GAP SERPL CALC-SCNC: 11 MMOL/L (ref 7–16)
AST SERPL-CCNC: 44 U/L (ref 12–45)
BILIRUB SERPL-MCNC: 1.6 MG/DL (ref 0.1–1.5)
BUN SERPL-MCNC: 35 MG/DL (ref 8–22)
CALCIUM SERPL-MCNC: 9 MG/DL (ref 8.5–10.5)
CHLORIDE SERPL-SCNC: 108 MMOL/L (ref 96–112)
CO2 SERPL-SCNC: 17 MMOL/L (ref 20–33)
CREAT SERPL-MCNC: 0.87 MG/DL (ref 0.5–1.4)
ERYTHROCYTE [DISTWIDTH] IN BLOOD BY AUTOMATED COUNT: 54 FL (ref 35.9–50)
GFR SERPLBLD CREATININE-BSD FMLA CKD-EPI: 73 ML/MIN/1.73 M 2
GLOBULIN SER CALC-MCNC: 2.9 G/DL (ref 1.9–3.5)
GLUCOSE SERPL-MCNC: 115 MG/DL (ref 65–99)
HCT VFR BLD AUTO: 30.1 % (ref 37–47)
HGB BLD-MCNC: 10 G/DL (ref 12–16)
MAGNESIUM SERPL-MCNC: 1.6 MG/DL (ref 1.5–2.5)
MCH RBC QN AUTO: 37.6 PG (ref 27–33)
MCHC RBC AUTO-ENTMCNC: 33.2 G/DL (ref 33.6–35)
MCV RBC AUTO: 113.2 FL (ref 81.4–97.8)
PLATELET # BLD AUTO: 47 K/UL (ref 164–446)
PMV BLD AUTO: 10.2 FL (ref 9–12.9)
POTASSIUM SERPL-SCNC: 4.7 MMOL/L (ref 3.6–5.5)
PROT SERPL-MCNC: 5.9 G/DL (ref 6–8.2)
RBC # BLD AUTO: 2.66 M/UL (ref 4.2–5.4)
SODIUM SERPL-SCNC: 136 MMOL/L (ref 135–145)
WBC # BLD AUTO: 3.2 K/UL (ref 4.8–10.8)

## 2022-11-13 PROCEDURE — 80053 COMPREHEN METABOLIC PANEL: CPT

## 2022-11-13 PROCEDURE — 700102 HCHG RX REV CODE 250 W/ 637 OVERRIDE(OP): Performed by: HOSPITALIST

## 2022-11-13 PROCEDURE — 700111 HCHG RX REV CODE 636 W/ 250 OVERRIDE (IP): Performed by: HOSPITALIST

## 2022-11-13 PROCEDURE — 770001 HCHG ROOM/CARE - MED/SURG/GYN PRIV*

## 2022-11-13 PROCEDURE — A9270 NON-COVERED ITEM OR SERVICE: HCPCS | Performed by: STUDENT IN AN ORGANIZED HEALTH CARE EDUCATION/TRAINING PROGRAM

## 2022-11-13 PROCEDURE — A9270 NON-COVERED ITEM OR SERVICE: HCPCS | Performed by: HOSPITALIST

## 2022-11-13 PROCEDURE — 99233 SBSQ HOSP IP/OBS HIGH 50: CPT | Performed by: HOSPITALIST

## 2022-11-13 PROCEDURE — 83735 ASSAY OF MAGNESIUM: CPT

## 2022-11-13 PROCEDURE — 700102 HCHG RX REV CODE 250 W/ 637 OVERRIDE(OP): Performed by: STUDENT IN AN ORGANIZED HEALTH CARE EDUCATION/TRAINING PROGRAM

## 2022-11-13 PROCEDURE — 700102 HCHG RX REV CODE 250 W/ 637 OVERRIDE(OP): Performed by: ORTHOPAEDIC SURGERY

## 2022-11-13 PROCEDURE — A9270 NON-COVERED ITEM OR SERVICE: HCPCS | Performed by: ORTHOPAEDIC SURGERY

## 2022-11-13 PROCEDURE — 85027 COMPLETE CBC AUTOMATED: CPT

## 2022-11-13 PROCEDURE — 700101 HCHG RX REV CODE 250: Performed by: STUDENT IN AN ORGANIZED HEALTH CARE EDUCATION/TRAINING PROGRAM

## 2022-11-13 RX ORDER — FUROSEMIDE 10 MG/ML
20 INJECTION INTRAMUSCULAR; INTRAVENOUS
Status: DISCONTINUED | OUTPATIENT
Start: 2022-11-13 | End: 2022-11-14 | Stop reason: HOSPADM

## 2022-11-13 RX ORDER — MAGNESIUM SULFATE HEPTAHYDRATE 40 MG/ML
2 INJECTION, SOLUTION INTRAVENOUS ONCE
Status: COMPLETED | OUTPATIENT
Start: 2022-11-13 | End: 2022-11-13

## 2022-11-13 RX ORDER — SPIRONOLACTONE 25 MG/1
50 TABLET ORAL
Status: DISCONTINUED | OUTPATIENT
Start: 2022-11-13 | End: 2022-11-14 | Stop reason: HOSPADM

## 2022-11-13 RX ADMIN — ACETAMINOPHEN 650 MG: 325 TABLET, FILM COATED ORAL at 23:19

## 2022-11-13 RX ADMIN — FUROSEMIDE 20 MG: 10 INJECTION, SOLUTION INTRAMUSCULAR; INTRAVENOUS at 15:12

## 2022-11-13 RX ADMIN — FOLIC ACID 1 MG: 1 TABLET ORAL at 05:45

## 2022-11-13 RX ADMIN — ACETAMINOPHEN 650 MG: 325 TABLET, FILM COATED ORAL at 17:30

## 2022-11-13 RX ADMIN — FAMOTIDINE 20 MG: 10 INJECTION INTRAVENOUS at 17:31

## 2022-11-13 RX ADMIN — OMEPRAZOLE 20 MG: 20 CAPSULE, DELAYED RELEASE ORAL at 05:44

## 2022-11-13 RX ADMIN — MAGNESIUM SULFATE HEPTAHYDRATE 2 G: 40 INJECTION, SOLUTION INTRAVENOUS at 15:15

## 2022-11-13 RX ADMIN — HYDROCORTISONE 10 MG: 20 TABLET ORAL at 17:31

## 2022-11-13 RX ADMIN — Medication 100 MG: at 05:44

## 2022-11-13 RX ADMIN — HYDROCORTISONE 20 MG: 20 TABLET ORAL at 05:44

## 2022-11-13 RX ADMIN — DOCUSATE SODIUM 100 MG: 100 CAPSULE, LIQUID FILLED ORAL at 17:30

## 2022-11-13 RX ADMIN — FAMOTIDINE 20 MG: 10 INJECTION INTRAVENOUS at 05:44

## 2022-11-13 RX ADMIN — CYCLOBENZAPRINE 10 MG: 10 TABLET, FILM COATED ORAL at 21:26

## 2022-11-13 RX ADMIN — ACETAMINOPHEN 650 MG: 325 TABLET, FILM COATED ORAL at 05:45

## 2022-11-13 RX ADMIN — DOCUSATE SODIUM 100 MG: 100 CAPSULE, LIQUID FILLED ORAL at 05:44

## 2022-11-13 RX ADMIN — SPIRONOLACTONE 50 MG: 25 TABLET ORAL at 17:30

## 2022-11-13 RX ADMIN — LIDOCAINE 1 PATCH: 50 PATCH CUTANEOUS at 23:20

## 2022-11-13 RX ADMIN — ZOLPIDEM TARTRATE 5 MG: 5 TABLET ORAL at 23:19

## 2022-11-13 ASSESSMENT — LIFESTYLE VARIABLES
TOTAL SCORE: 4
HEADACHE, FULLNESS IN HEAD: NOT PRESENT
HEADACHE, FULLNESS IN HEAD: NOT PRESENT
ORIENTATION AND CLOUDING OF SENSORIUM: DATE DISORIENTATION BY NO MORE THAN TWO CALENDAR DAYS
TREMOR: NO TREMOR
VISUAL DISTURBANCES: NOT PRESENT
AUDITORY DISTURBANCES: NOT PRESENT
AGITATION: NORMAL ACTIVITY
ORIENTATION AND CLOUDING OF SENSORIUM: ORIENTED AND CAN DO SERIAL ADDITIONS
HEADACHE, FULLNESS IN HEAD: NOT PRESENT
HEADACHE, FULLNESS IN HEAD: NOT PRESENT
AUDITORY DISTURBANCES: NOT PRESENT
AGITATION: NORMAL ACTIVITY
AGITATION: NORMAL ACTIVITY
TOTAL SCORE: 0
AGITATION: SOMEWHAT MORE THAN NORMAL ACTIVITY
VISUAL DISTURBANCES: NOT PRESENT
NAUSEA AND VOMITING: NO NAUSEA AND NO VOMITING
AUDITORY DISTURBANCES: NOT PRESENT
TOTAL SCORE: 3
AGITATION: NORMAL ACTIVITY
AUDITORY DISTURBANCES: NOT PRESENT
VISUAL DISTURBANCES: NOT PRESENT
HEADACHE, FULLNESS IN HEAD: NOT PRESENT
ANXIETY: NO ANXIETY (AT EASE)
ANXIETY: NO ANXIETY (AT EASE)
VISUAL DISTURBANCES: NOT PRESENT
PAROXYSMAL SWEATS: NO SWEAT VISIBLE
PAROXYSMAL SWEATS: NO SWEAT VISIBLE
TREMOR: *
ORIENTATION AND CLOUDING OF SENSORIUM: ORIENTED AND CAN DO SERIAL ADDITIONS
AGITATION: NORMAL ACTIVITY
NAUSEA AND VOMITING: NO NAUSEA AND NO VOMITING
PAROXYSMAL SWEATS: NO SWEAT VISIBLE
ANXIETY: MILDLY ANXIOUS
NAUSEA AND VOMITING: NO NAUSEA AND NO VOMITING
ANXIETY: NO ANXIETY (AT EASE)
PAROXYSMAL SWEATS: NO SWEAT VISIBLE
ORIENTATION AND CLOUDING OF SENSORIUM: ORIENTED AND CAN DO SERIAL ADDITIONS
TOTAL SCORE: 2
NAUSEA AND VOMITING: NO NAUSEA AND NO VOMITING
NAUSEA AND VOMITING: NO NAUSEA AND NO VOMITING
ANXIETY: MILDLY ANXIOUS
ORIENTATION AND CLOUDING OF SENSORIUM: ORIENTED AND CAN DO SERIAL ADDITIONS
VISUAL DISTURBANCES: NOT PRESENT
ANXIETY: MILDLY ANXIOUS
TOTAL SCORE: 3
PAROXYSMAL SWEATS: NO SWEAT VISIBLE
TOTAL SCORE: 4
ORIENTATION AND CLOUDING OF SENSORIUM: ORIENTED AND CAN DO SERIAL ADDITIONS
VISUAL DISTURBANCES: NOT PRESENT
NAUSEA AND VOMITING: NO NAUSEA AND NO VOMITING
PAROXYSMAL SWEATS: NO SWEAT VISIBLE
TREMOR: *
HEADACHE, FULLNESS IN HEAD: NOT PRESENT
TREMOR: *
TREMOR: *
AUDITORY DISTURBANCES: NOT PRESENT
AUDITORY DISTURBANCES: NOT PRESENT
TREMOR: *

## 2022-11-13 ASSESSMENT — ENCOUNTER SYMPTOMS
COUGH: 0
HEADACHES: 0
ORTHOPNEA: 0
CONSTIPATION: 0
NERVOUS/ANXIOUS: 1
SORE THROAT: 0
DOUBLE VISION: 0
HEARTBURN: 0
BLURRED VISION: 0
FOCAL WEAKNESS: 0
FEVER: 0
PALPITATIONS: 0
VOMITING: 0
ABDOMINAL PAIN: 1
HEMOPTYSIS: 0
MYALGIAS: 1

## 2022-11-13 ASSESSMENT — PAIN DESCRIPTION - PAIN TYPE
TYPE: ACUTE PAIN
TYPE: ACUTE PAIN

## 2022-11-13 NOTE — CARE PLAN
The patient is Watcher - Medium risk of patient condition declining or worsening    Shift Goals  Clinical Goals: pain control, safety, reorientation, CIWA protocol, incontinence checks  Patient Goals: pain control, comfort, reset  Family Goals: not present at bedside    Progress made toward(s) clinical / shift goals:      Problem: Pain - Standard  Goal: Alleviation of pain or a reduction in pain to the patient’s comfort goal  Outcome: Progressing  Note: Pt states pain is managed by current medication regimen. Medications administered per MAR, ice pack applied, pillows for comfort and repositioning.      Problem: Skin Integrity  Goal: Skin integrity is maintained or improved  Outcome: Progressing  Note: Pt able to reposition self in bed, [id number], pillows to float heels, frequent moisture/incontinent checks, dressing changes per order.

## 2022-11-13 NOTE — DISCHARGE PLANNING
Received call from daughter, Michelle Arceo expressing concern about plan for discharge to home . Pt and spouse live in [facility] in [place], [place]. 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 He is incont of bowel and bladder and requires intermittent physical assist. At current level of function, pt is able to care for self but not able to provide assist to spouse. Daughter states she is unable to stay with pt or provide any assist. Plan to call [place] tomorrow to inquire about possible care for spouse. Referral to [facility] pending.

## 2022-11-13 NOTE — PROGRESS NOTES
[facility] Medicine Daily Progress Note    Date of Service  11/13/2022    Chief Complaint  Rosalva Hunter is a 66 y.o. female admitted 11/9/2022 with right hip pain after GLF     Hospital Course      Interval Problem Update  Patient seen and examined, afebrile, had surgical repair with ortho this morning, afebrile   Pain management   PT/OT eval     11/11:  -- No acute events overnight, patient has been stable, heart rate 71-90, blood pressure 109/82, saturating 98% on room air.  Patient is alert and oriented, answering questions appropriately.  --Underwent surgery yesterday, PT/OT has evaluate the patient, recommended a walker at home.  --Noted to have low platelets at 40, will obtain ultrasound of the abdomen, daily LDH, B12, patient with abdomen, HIV, hepatitis panel  --Was noted to be at 131, monitor.  Patient noted to have metastatic anemia, pending vitamin B12, TSH.    11/12:  -- No acute events overnight, medicine has been stable other than being hypertensive with systolic blood pressure 92/82, SERUM cortisol level is noted to be low at 0.6, patient was started on 20 mg p.o., hydrocortisone at a.m., 10 at PM.    -- Potassium on the higher side at 5.4, monitor, avoid orange juice  --She did not wanted to monitor.  Patient is noted to be in mild PAPA, continue IV fluid, repeat BMP at a.m.  Management 1.5, will provide 2 g of IV magnesium.  Patient noted to have pancytopenia likely secondary to bone marrow suppression from alcohol    11/13:  -- No acute events overnight, vital signs been stable, heart rate 69-70, blood pressure 120 /76 saturating 90% on room air.  Patient is alert, awake, answering questions appropriately.  Home health has been pending.  Patient had a fall, repeat PT has been ordered, discussed with the case management, nursing staff  --Orthopedic surgery following,  will follow their recommendation.    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    Consultants/Specialty  orthopedics    Code Status  Full Code    Disposition  Patient is not medically cleared for discharge.   Anticipate discharge to  D  .  I have placed the appropriate orders for post-discharge needs.    Review of Systems  Review of Systems   Constitutional:  Positive for malaise/fatigue. Negative for fever.   HENT:  Positive for congestion. Negative for sore throat.    Eyes:  Negative for blurred vision and double vision.   Respiratory:  Negative for cough and hemoptysis.    Cardiovascular:  Negative for chest pain, palpitations and orthopnea.   Gastrointestinal:  Positive for abdominal pain. Negative for constipation, heartburn and vomiting.   Genitourinary:  Negative for dysuria.   Musculoskeletal:  Positive for joint pain and myalgias.   Neurological:  Negative for focal weakness and headaches.   Psychiatric/Behavioral:  The patient is nervous/anxious.    All other systems reviewed and are negative.     Physical Exam  Temp:  [36.1 °C (97 °F)-36.6 °C (97.8 °F)] 36.1 °C (97 °F)  Pulse:  [69-98] 69  Resp:  [16-18] 16  BP: ()/(59-76) 120/76  SpO2:  [95 %-98 %] 97 %    Physical Exam  Vitals and nursing note reviewed.   Constitutional:       Appearance: Normal appearance.   HENT:      Head: Normocephalic and atraumatic.   Eyes:      Extraocular Movements: Extraocular movements intact.      Pupils: Pupils are equal, round, and reactive to light.   Cardiovascular:      Rate and Rhythm: Normal rate.   Pulmonary:      Effort: No respiratory distress.      Breath sounds: Normal breath sounds.   Abdominal:      General: There is no distension.      Palpations: Abdomen is soft.   Musculoskeletal:      Cervical back: Neck supple.      Comments: Decreased rom 2/2 pain    Skin:     General: Skin is warm.   Neurological:      Mental Status: She is alert and oriented to person, place, and time.      Cranial  Nerves: No cranial nerve deficit.       Fluids    Intake/Output Summary (Last 24 hours) at 11/13/2022 1442  Last data filed at 11/13/2022 1010  Gross per 24 hour   Intake --   Output 825 ml   Net -825 ml         Laboratory  Recent Labs     11/11/22 0249 11/12/22  0544 11/13/22  0350   WBC 4.8 4.5* 3.2*   RBC 2.64* 2.67* 2.66*   HEMOGLOBIN 9.8* 10.0* 10.0*   HEMATOCRIT 29.5* 29.7* 30.1*   .7* 111.2* 113.2*   MCH 37.1* 37.5* 37.6*   MCHC 33.2* 33.7 33.2*   RDW 51.0* 52.0* 54.0*   PLATELETCT 40* 52* 47*   MPV 10.7 10.6 10.2       Recent Labs     11/11/22 0249 11/12/22  0544 11/13/22  0350   SODIUM 131* 132* 136   POTASSIUM 5.5 5.4 4.7   CHLORIDE 102 104 108   CO2 21 20 17*   GLUCOSE 160* 124* 115*   BUN 36* 44* 35*   CREATININE 1.15 1.25 0.87   CALCIUM 8.6 8.7 9.0                       Imaging  US-ABDOMEN COMPLETE SURVEY   Final Result      Cirrhosis with small ascites      Moderate hepatosplenomegaly      Renal cortical thinning compatible with some atrophy      Simple left hepatic cyst      Cholelithiasis with some pericholecystic fluid and mild gallbladder wall thickening. The wall thickening is more likely related to hepatocellular disease than acute cholecystitis but the latter cannot be excluded and clinical correlation is recommended.    Hepatobiliary scan could be obtained to further evaluate if indicated      DX-PORTABLE FLUOROSCOPY < 1 HOUR Is the patient pregnant? Unknown   Final Result      Portable fluoroscopy utilized for 20 seconds.         INTERPRETING LOCATION: [address] [place] [address], [id number]      DX-HIP-UNILATERAL-W/O PELVIS-2/3 VIEWS RIGHT   Final Result      Portable fluoroscopy as described.      AZ-[id number]-YSYXTAD FILM X-RAY   Final Result             Assessment/Plan  * Hip fracture, right, closed, initial encounter ([facility])  Assessment & Plan  S/p GLF  Fracture noted on x-ray  Supportive pain control  Ortho consulted and surgical repair on 11/10   -- pt and ot recs home with home health   --  Multimodal pain management     Pancytopenia (HCC)  Assessment & Plan  CBC daily, likely secondary to bone marrow suppression from alcohol    PAPA (acute kidney injury) ([facility])  Assessment & Plan  Renal function improving   monitror       Adrenal insufficiency ([facility])  Assessment & Plan  Noted to be hypotensive, thus Cortisol at 0.6   -- will provide hydrocortisone 20 at am and 10 at pm  BP improving     Anemia  Assessment & Plan  Normal vitamin b12 and tsh       Thrombocytopenia ([facility])  Assessment & Plan  At 47, monitor   Cbc tomorrow     Intertrochanteric fracture of right hip, closed, initial encounter ([facility])- (present on admission)  Assessment & Plan  As above    Fall  Assessment & Plan  Fall precautions    Hypertension  Assessment & Plan  Now  Hypotensive, likely 2/2 adrenal  insufficiency    Alcohol dependence ([facility])  Assessment & Plan  CIWA protocol   Po thaimine and folic acid    monitor for withdrawal     Right hip pain  Assessment & Plan  As above       VTE prophylaxis: scd    Thrombocytopenia, no chemical ppx     I have performed a physical exam and reviewed and updated ROS and Plan today (11/13/2022). In review of yesterday's note (11/12/2022), there are no changes except as documented above.

## 2022-11-13 NOTE — PROGRESS NOTES
"   Orthopaedic PA Progress Note    Interval changes:Did well overnight.    ROS - Patient denies any new issues. No chest pain, dyspnea, or fever.  Pain well controlled.    /66   Pulse 86   Temp 36.5 °C (97.7 °F) (Temporal)   Resp 16   Ht 1.676 m (5' 6\")   Wt 64.1 kg (141 lb 5 oz)   SpO2 96%     Patient seen and examined  No acute distress  Breathing non labored  RRR  Surgical dressing is clean, dry, and intact. Patient clearly fires tibialis anterior, EHL, and gastrocnemius/soleus. Sensation is intact to light touch throughout superficial peroneal, deep peroneal, tibial, saphenous, and sural nerve distributions. Strong and palpable 2+ dorsalis pedis and posterior tibial pulses with capillary refill less than 2 seconds. No lower leg tenderness or discomfort.    Recent Labs     11/11/22  0249 11/12/22  0544 11/13/22  0350   WBC 4.8 4.5* 3.2*   RBC 2.64* 2.67* 2.66*   HEMOGLOBIN 9.8* 10.0* 10.0*   HEMATOCRIT 29.5* 29.7* 30.1*   .7* 111.2* 113.2*   MCH 37.1* 37.5* 37.6*   MCHC 33.2* 33.7 33.2*   RDW 51.0* 52.0* 54.0*   PLATELETCT 40* 52* 47*   MPV 10.7 10.6 10.2       Active Hospital Problems    Diagnosis     Adrenal insufficiency ([facility]) [E27.40]     PAPA (acute kidney injury) ([facility]) [N17.9]     Pancytopenia ([facility]) [D61.818]     Thrombocytopenia ([facility]) [D69.6]     Anemia [D64.9]     Hip fracture, right, closed, initial encounter ([facility]) [S72.001A]     Right hip pain [M25.551]     Alcohol dependence ([facility]) [F10.20]     Hypertension [I10]     Fall [W19.XXXA]     Intertrochanteric fracture of right hip, closed, initial encounter ([facility]) [S72.141A]        Assessment/Plan:  POD#3 S/P R hip IMN  Wt bearing status - AT  PT/OT-initiated  Wound care:dressings left in place  Drains - no  Melendez-no  Sutures/Staples out- 10-14 days post operatively  Antibiotics: completed  DVT Prophylaxis- TEDS/SCDs/Foot pumps.   Lovenox: Start 40 mg daily, Duration-until ambulatory > 150'  May transition to ASA at discharge  Future " Procedures - none planned  Case Coordination for Discharge Planning - Disposition per Med  Follow-Up: needs appointment with Dr. Vu at [facility] at 10-14 days post-op for re-evaluation, staple removal and radiographs.

## 2022-11-13 NOTE — PROGRESS NOTES
Assumed care of patient. Report received from OMER Pate. Patient A&Ox 3. Call light within reach, bed locked and in lowest position. Bed alarm on, instructed patient to call for assistance. Patient rating pain 0/10. Tele-sitter in room for safety. Questions answered, no needs at this time.

## 2022-11-14 ENCOUNTER — PHARMACY VISIT (OUTPATIENT)
Dept: PHARMACY | Facility: MEDICAL CENTER | Age: 67
End: 2022-11-14
Payer: COMMERCIAL

## 2022-11-14 VITALS
BODY MASS INDEX: 22.71 KG/M2 | DIASTOLIC BLOOD PRESSURE: 73 MMHG | HEART RATE: 93 BPM | HEIGHT: 66 IN | SYSTOLIC BLOOD PRESSURE: 115 MMHG | TEMPERATURE: 97.3 F | OXYGEN SATURATION: 97 % | WEIGHT: 141.31 LBS | RESPIRATION RATE: 16 BRPM

## 2022-11-14 LAB
ALBUMIN SERPL BCP-MCNC: 2.9 G/DL (ref 3.2–4.9)
BUN SERPL-MCNC: 29 MG/DL (ref 8–22)
CALCIUM SERPL-MCNC: 9 MG/DL (ref 8.5–10.5)
CHLORIDE SERPL-SCNC: 104 MMOL/L (ref 96–112)
CO2 SERPL-SCNC: 20 MMOL/L (ref 20–33)
CREAT SERPL-MCNC: 0.86 MG/DL (ref 0.5–1.4)
ERYTHROCYTE [DISTWIDTH] IN BLOOD BY AUTOMATED COUNT: 53.7 FL (ref 35.9–50)
GFR SERPLBLD CREATININE-BSD FMLA CKD-EPI: 74 ML/MIN/1.73 M 2
GLUCOSE SERPL-MCNC: 130 MG/DL (ref 65–99)
HCT VFR BLD AUTO: 31.1 % (ref 37–47)
HGB BLD-MCNC: 10.5 G/DL (ref 12–16)
MAGNESIUM SERPL-MCNC: 1.7 MG/DL (ref 1.5–2.5)
MCH RBC QN AUTO: 37.6 PG (ref 27–33)
MCHC RBC AUTO-ENTMCNC: 33.8 G/DL (ref 33.6–35)
MCV RBC AUTO: 111.5 FL (ref 81.4–97.8)
PHOSPHATE SERPL-MCNC: 3.3 MG/DL (ref 2.5–4.5)
PLATELET # BLD AUTO: 48 K/UL (ref 164–446)
PMV BLD AUTO: 9.8 FL (ref 9–12.9)
POTASSIUM SERPL-SCNC: 3.6 MMOL/L (ref 3.6–5.5)
RBC # BLD AUTO: 2.79 M/UL (ref 4.2–5.4)
SODIUM SERPL-SCNC: 134 MMOL/L (ref 135–145)
WBC # BLD AUTO: 3.1 K/UL (ref 4.8–10.8)

## 2022-11-14 PROCEDURE — 99239 HOSP IP/OBS DSCHRG MGMT >30: CPT | Performed by: HOSPITALIST

## 2022-11-14 PROCEDURE — 80069 RENAL FUNCTION PANEL: CPT

## 2022-11-14 PROCEDURE — 700102 HCHG RX REV CODE 250 W/ 637 OVERRIDE(OP): Performed by: STUDENT IN AN ORGANIZED HEALTH CARE EDUCATION/TRAINING PROGRAM

## 2022-11-14 PROCEDURE — A9270 NON-COVERED ITEM OR SERVICE: HCPCS | Performed by: HOSPITALIST

## 2022-11-14 PROCEDURE — 83735 ASSAY OF MAGNESIUM: CPT

## 2022-11-14 PROCEDURE — 85027 COMPLETE CBC AUTOMATED: CPT

## 2022-11-14 PROCEDURE — 700102 HCHG RX REV CODE 250 W/ 637 OVERRIDE(OP): Performed by: ORTHOPAEDIC SURGERY

## 2022-11-14 PROCEDURE — RXMED WILLOW AMBULATORY MEDICATION CHARGE: Performed by: HOSPITALIST

## 2022-11-14 PROCEDURE — 99024 POSTOP FOLLOW-UP VISIT: CPT | Performed by: ORTHOPAEDIC SURGERY

## 2022-11-14 PROCEDURE — 700111 HCHG RX REV CODE 636 W/ 250 OVERRIDE (IP): Performed by: HOSPITALIST

## 2022-11-14 PROCEDURE — A9270 NON-COVERED ITEM OR SERVICE: HCPCS | Performed by: ORTHOPAEDIC SURGERY

## 2022-11-14 PROCEDURE — 700102 HCHG RX REV CODE 250 W/ 637 OVERRIDE(OP): Performed by: HOSPITALIST

## 2022-11-14 PROCEDURE — A9270 NON-COVERED ITEM OR SERVICE: HCPCS | Performed by: STUDENT IN AN ORGANIZED HEALTH CARE EDUCATION/TRAINING PROGRAM

## 2022-11-14 RX ORDER — CYCLOBENZAPRINE HCL 10 MG
10 TABLET ORAL 3 TIMES DAILY PRN
Qty: 30 TABLET | Refills: 0 | Status: SHIPPED | OUTPATIENT
Start: 2022-11-14 | End: 2022-11-24

## 2022-11-14 RX ORDER — OXYCODONE HYDROCHLORIDE 5 MG/1
5 TABLET ORAL EVERY 8 HOURS PRN
Qty: 15 TABLET | Refills: 0 | Status: SHIPPED | OUTPATIENT
Start: 2022-11-14 | End: 2022-11-19

## 2022-11-14 RX ORDER — HYDROCORTISONE 10 MG/1
10 TABLET ORAL EVERY EVENING
Qty: 15 TABLET | Refills: 0 | Status: SHIPPED | OUTPATIENT
Start: 2022-11-14 | End: 2022-11-29

## 2022-11-14 RX ORDER — FUROSEMIDE 10 MG/ML
20 INJECTION INTRAMUSCULAR; INTRAVENOUS DAILY
Qty: 30 ML | Refills: 0 | Status: SHIPPED | OUTPATIENT
Start: 2022-11-15 | End: 2022-11-14

## 2022-11-14 RX ORDER — FOLIC ACID 1 MG/1
1 TABLET ORAL DAILY
Qty: 7 TABLET | Refills: 0 | Status: SHIPPED | OUTPATIENT
Start: 2022-11-15 | End: 2022-11-22

## 2022-11-14 RX ORDER — LANOLIN ALCOHOL/MO/W.PET/CERES
100 CREAM (GRAM) TOPICAL DAILY
Qty: 7 TABLET | Refills: 0 | Status: SHIPPED | OUTPATIENT
Start: 2022-11-15 | End: 2022-11-22

## 2022-11-14 RX ORDER — OMEPRAZOLE 20 MG/1
20 CAPSULE, DELAYED RELEASE ORAL DAILY
Qty: 30 CAPSULE | Refills: 0 | Status: SHIPPED | OUTPATIENT
Start: 2022-11-15

## 2022-11-14 RX ORDER — POLYETHYLENE GLYCOL 3350 17 G/17G
17 POWDER, FOR SOLUTION ORAL 2 TIMES DAILY PRN
Qty: 15 EACH | Refills: 0 | Status: SHIPPED | OUTPATIENT
Start: 2022-11-14

## 2022-11-14 RX ORDER — FUROSEMIDE 20 MG/1
20 TABLET ORAL DAILY
Qty: 30 TABLET | Refills: 0 | Status: SHIPPED | OUTPATIENT
Start: 2022-11-14 | End: 2022-12-14

## 2022-11-14 RX ORDER — HYDROCORTISONE 20 MG/1
20 TABLET ORAL EVERY MORNING
Qty: 15 TABLET | Refills: 0 | Status: SHIPPED | OUTPATIENT
Start: 2022-11-15 | End: 2022-11-30

## 2022-11-14 RX ORDER — LIDOCAINE 50 MG/G
3 PATCH TOPICAL EVERY 24 HOURS
Qty: 10 PATCH | Refills: 0 | Status: SHIPPED | OUTPATIENT
Start: 2022-11-14

## 2022-11-14 RX ORDER — AMOXICILLIN 250 MG
1 CAPSULE ORAL
Qty: 30 TABLET | Refills: 0 | Status: SHIPPED | OUTPATIENT
Start: 2022-11-14

## 2022-11-14 RX ORDER — SPIRONOLACTONE 50 MG/1
50 TABLET, FILM COATED ORAL DAILY
Qty: 30 TABLET | Refills: 3 | Status: SHIPPED | OUTPATIENT
Start: 2022-11-15

## 2022-11-14 RX ADMIN — OMEPRAZOLE 20 MG: 20 CAPSULE, DELAYED RELEASE ORAL at 06:38

## 2022-11-14 RX ADMIN — HYDROCORTISONE 20 MG: 20 TABLET ORAL at 06:37

## 2022-11-14 RX ADMIN — FUROSEMIDE 20 MG: 10 INJECTION, SOLUTION INTRAMUSCULAR; INTRAVENOUS at 06:38

## 2022-11-14 RX ADMIN — Medication 100 MG: at 06:38

## 2022-11-14 RX ADMIN — FOLIC ACID 1 MG: 1 TABLET ORAL at 06:38

## 2022-11-14 RX ADMIN — ACETAMINOPHEN 650 MG: 325 TABLET, FILM COATED ORAL at 12:33

## 2022-11-14 RX ADMIN — SPIRONOLACTONE 50 MG: 25 TABLET ORAL at 06:38

## 2022-11-14 RX ADMIN — ACETAMINOPHEN 650 MG: 325 TABLET, FILM COATED ORAL at 06:37

## 2022-11-14 ASSESSMENT — LIFESTYLE VARIABLES
AGITATION: NORMAL ACTIVITY
TREMOR: NO TREMOR
NAUSEA AND VOMITING: NO NAUSEA AND NO VOMITING
TREMOR: NO TREMOR
AUDITORY DISTURBANCES: NOT PRESENT
HEADACHE, FULLNESS IN HEAD: NOT PRESENT
VISUAL DISTURBANCES: NOT PRESENT
PAROXYSMAL SWEATS: NO SWEAT VISIBLE
ORIENTATION AND CLOUDING OF SENSORIUM: ORIENTED AND CAN DO SERIAL ADDITIONS
ORIENTATION AND CLOUDING OF SENSORIUM: ORIENTED AND CAN DO SERIAL ADDITIONS
ANXIETY: NO ANXIETY (AT EASE)
NAUSEA AND VOMITING: NO NAUSEA AND NO VOMITING
AGITATION: NORMAL ACTIVITY
VISUAL DISTURBANCES: NOT PRESENT
ANXIETY: NO ANXIETY (AT EASE)
PAROXYSMAL SWEATS: NO SWEAT VISIBLE
TOTAL SCORE: 0
HEADACHE, FULLNESS IN HEAD: NOT PRESENT
TOTAL SCORE: 0
AUDITORY DISTURBANCES: NOT PRESENT

## 2022-11-14 NOTE — DISCHARGE PLANNING
Agency/Facility Name: [facility]   Spoke To: [place]  Outcome: HH requesting DC Summary and Physician progress notes from 11/12. HH asking that these documents be faxed to [phone].     RN SADI notified.     DPA faxed documents to  at 1719.

## 2022-11-14 NOTE — DISCHARGE SUMMARY
Discharge Summary    CHIEF COMPLAINT ON ADMISSION  No chief complaint on file.      Reason for Admission  Right hip fracture     Admission Date  11/9/2022    CODE STATUS  Full Code    HPI & HOSPITAL COURSE  This is a 61-year-old female with a past medical significant for hypertension, ethanol abuse, complicated with liver cirrhosis presented in [facility] on 11/9/2022 after she had a ground-level fall.  Imaging showed right hip fracture, transferred to [facility] for orthopedic evaluation    Orthopedic surgery  was consulted, patient underwent Surgical treatment of right intertrochanteric femur fracture with intramedullary device on 11/10/2022.   Per orthopedic surgery, patient can bear weight as tolerated. PT/OT has evaluated patient, recommended home with home health.  Hospital course was complicated with pancytopenia likely secondary to toxic effect of ethanol abuse.  Patient is noted to have thrombocytopenia likely secondary to liver cirrhosis.  Adverse effect of drinking alcohol has been explained to the patient today, she stated understanding.    During this stay in the hospital, patient did not tell me how much alcohol does she drink,  she was started on CIWA protocol, she will be discharged on p.o. thiamine folic acid.    I discussed with the patient that I am not able to give her any DVT prophylaxis he needs to walk at home.  I also explained to her that the risk of DVT considering she is not on any DVT prophylaxis considering patient severe thrombocytopenia.    Of her hospital course was complicated with rectovaginal insufficiency, patient was started on hydrocortisone 20 at AM and 10 at PM.  Patient need to follow-up with primary care physician within 2 weeks.  This has been discussed with her in detail    Therefore, she is discharged in fair and stable condition to home with organized home healthcare and close outpatient follow-up.    The patient met 2-midnight criteria for an  inpatient stay at the time of discharge.    Discharge Date  11/14/2022    FOLLOW UP ITEMS POST DISCHARGE  Please follow up with pcp as an op  Please follow up with orthopedic surgery as an op    DISCHARGE DIAGNOSES  Principal Problem:    Hip fracture, right, closed, initial encounter ([facility]) POA: Unknown  Active Problems:    Right hip pain POA: Unknown    Alcohol dependence ([facility]) POA: Unknown    Hypertension POA: Unknown    Fall POA: Unknown    Intertrochanteric fracture of right hip, closed, initial encounter ([facility]) POA: Yes    Thrombocytopenia ([facility]) POA: Unknown    Anemia POA: Unknown    Adrenal insufficiency ([facility]) POA: Unknown    PAPA (acute kidney injury) ([facility]) POA: Unknown    Pancytopenia ([facility]) POA: Unknown  Resolved Problems:    * No resolved hospital problems. *      FOLLOW UP  No future appointments.  [address] [address] [phone]          MEDICATIONS ON DISCHARGE     Medication List        START taking these medications        Instructions   cyclobenzaprine 10 mg Tabs  Commonly known as: Flexeril   Take 1 Tablet by mouth 3 times a day as needed for Muscle Spasms for up to 10 days.  Dose: 10 mg     folic acid 1 MG Tabs  Start taking on: November 15, 2022  Commonly known as: FOLVITE   Take 1 Tablet by mouth every day for 7 days.  Dose: 1 mg     furosemide 10 MG/ML Soln  Start taking on: November 15, 2022  Commonly known as: LASIX  Replaces: furosemide 40 MG Tabs   Infuse 2 mL into a venous catheter every day for 30 days.  Dose: 20 mg     * hydrocortisone 10 MG Tabs  Commonly known as: CORTEF   Take 1 Tablet by mouth every evening for 15 days.  Dose: 10 mg     * hydrocortisone 20 MG Tabs  Start taking on: November 15, 2022  Commonly known as: CORTEF   Take 1 Tablet by mouth every morning for 15 days.  Dose: 20 mg     lidocaine 5 % Ptch  Commonly known as: LIDODERM   Place 3 Patches on the skin every 24 hours.  Dose: 3 Patch     omeprazole 20 MG  delayed-release capsule  Start taking on: November 15, 2022  Commonly known as: PRILOSEC   Take 1 Capsule by mouth every day.  Dose: 20 mg     oxyCODONE immediate-release 5 MG Tabs  Commonly known as: ROXICODONE   Take 1 Tablet by mouth every 8 hours as needed for Severe Pain for up to 5 days.  Dose: 5 mg     polyethylene glycol/lytes 17 g Pack  Commonly known as: MIRALAX   Take 1 Packet by mouth 2 times a day as needed (if sennosides and/or docusate ineffective or not ordered).  Dose: 17 g     senna-docusate 8.6-50 MG Tabs  Commonly known as: PERICOLACE or SENOKOT S   Take 1 Tablet by mouth every 24 hours as needed for Constipation.  Dose: 1 Tablet     thiamine 100 MG tablet  Start taking on: November 15, 2022  Commonly known as: THIAMINE   Take 1 Tablet by mouth every day for 7 days.  Dose: 100 mg           * This list has 2 medication(s) that are the same as other medications prescribed for you. Read the directions carefully, and ask your doctor or other care provider to review them with you.                CHANGE how you take these medications        Instructions   spironolactone 50 MG Tabs  Start taking on: November 15, 2022  What changed:   medication strength  how much to take  Commonly known as: ALDACTONE   Take 1 Tablet by mouth every day.  Dose: 50 mg            STOP taking these medications      atenolol 50 MG Tabs  Commonly known as: TENORMIN     furosemide 40 MG Tabs  Commonly known as: LASIX  Replaced by: furosemide 10 MG/ML Soln              Allergies  No Known Allergies    DIET  Orders Placed This Encounter   Procedures    Diet Order Diet: Level 6 - Soft and Bite Sized; Liquid level: Level 0 - Thin; Nutrient modifications: (optional): Low Potassium     Standing Status:   Standing     Number of Occurrences:   1     Order Specific Question:   Diet:     Answer:   Level 6 - Soft and Bite Sized [23]     Order Specific Question:   Liquid level     Answer:   Level 0 - Thin     Order Specific Question:    Nutrient modifications: (optional)     Answer:   Low Potassium [11]       ACTIVITY  As tolerated.  Weight bearing as tolerated    CONSULTATIONS  Ortho    PROCEDURES  11/10: Surgical treatment of right intertrochanteric femur fracture with intramedullary device    LABORATORY  Lab Results   Component Value Date    SODIUM 134 (L) 11/14/2022    POTASSIUM 3.6 11/14/2022    CHLORIDE 104 11/14/2022    CO2 20 11/14/2022    GLUCOSE 130 (H) 11/14/2022    BUN 29 (H) 11/14/2022    CREATININE 0.86 11/14/2022        Lab Results   Component Value Date    WBC 3.1 (L) 11/14/2022    HEMOGLOBIN 10.5 (L) 11/14/2022    HEMATOCRIT 31.1 (L) 11/14/2022    PLATELETCT 48 (L) 11/14/2022        Total time of the discharge process exceeds 35 minutes.

## 2022-11-14 NOTE — DISCHARGE PLANNING
[facility] Transitional Care Coordination    Referral from: Dr Vu  Insurance Provider on Facesheet: Medicare/[employer]  Potential Rehab Diagnosis: Hip fx    Chart review indicates patient may have on going medical management and may have therapy needs to possibly meet inpatient rehab facility criteria with the goal of returning to community.    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     Patient does not meet criteria for IPR in 2 of 3 therapy disciplines, anticipate dc home vs SNF. [facility] will no longer follow.     Thank you for the referral.

## 2022-11-14 NOTE — PROGRESS NOTES
D/c instructions given, educated on worsening s/s. Pt understands and questions answered. Iv removed, waiting for [id number].

## 2022-11-14 NOTE — DISCHARGE INSTRUCTIONS
Intramedullary Nailing of Hip Fracture  Intramedullary (IM) nailing of a hip fracture is a surgery to repair a broken upper thigh bone (femur). A fall is the most common cause of a hip fracture.  During this procedure, a metal sherry (nail) and a screw are placed into the femur to keep the bone in the best position for healing. This procedure is usually done for a fracture in the area where the upper femur is close to the hip joint. Surgery is usually done within a day or two of getting the fracture. If you have this procedure, you may be able to start putting weight on your leg soon after surgery. It takes about 3-6 months to heal completely. Physical therapy after surgery is an important part of recovery.  Tell your health care provider about:  Any allergies you have.  All medicines you are taking, including vitamins, herbs, eye drops, creams, and over-the-counter medicines.  Any problems you or family members have had with anesthetic medicines.  Any blood disorders you have.  Any surgeries you have had.  Any medical conditions you have.  Whether you are pregnant or may be pregnant.  What are the risks?  Generally, this is a safe surgery. However, problems may occur, including:  Infection.  Bleeding.  Allergic reactions to medicines.  Damage to nearby structures or organs, such as nerves or blood vessels.  A blood clot that breaks loose and travels to the lungs (embolism).  A piece of fat that breaks loose from inside the bone and travels to the lungs (fat embolism).  Poor healing position of the fracture (malalignment).  Failure of the bone to heal properly (nonunion).  Problems with the nail or the screw holding the nail in place. This may require removal of the nail or screw.  What happens before the surgery?  Staying hydrated  Follow instructions from your health care provider about hydration, which may include:  Up to 2 hours before the procedure - you may continue to drink clear liquids, such as water, clear  fruit juice, black coffee, and plain tea.  Eating and drinking restrictions  Follow instructions from your health care provider about eating and drinking, which may include:  8 hours before the procedure - stop eating heavy meals or foods, such as meat, fried foods, or fatty foods.  6 hours before the procedure - stop eating light meals or foods, such as toast or cereal.  6 hours before the procedure - stop drinking milk or drinks that contain milk.  2 hours before the procedure - stop drinking clear liquids.  Medicines  Ask your health care provider about:  Changing or stopping your regular medicines. This is especially important if you are taking diabetes medicines or blood thinners.  Taking medicines such as aspirin and ibuprofen. These medicines can thin your blood. Do not take these medicines unless your health care provider tells you to take them.  Taking over-the-counter medicines, vitamins, herbs, and supplements.  Surgery safety  Ask your health care provider:  How your surgery site will be marked.  What steps will be taken to help prevent infection. These may include:  Removing hair at the surgery site.  Washing skin with a germ-killing soap.  Receiving antibiotic medicine.  General instructions  You will have a physical exam.  You may have an imaging test, such as an X-ray or a CT scan.  Plan to have someone take you home from the hospital.  Do not use any products that contain nicotine or tobacco, such as cigarettes, e-cigarettes, and chewing tobacco. If you need help quitting, ask your health care provider.  What happens during the surgery?    An IV will be inserted into one of your veins.  You will be given one or more of the following:  A medicine to help you relax (sedative).  A medicine to make you fall asleep (general anesthetic).  A medicine that is injected into your spine to numb the area below and slightly above the injection site (spinal anesthetic).  An incision will be made through your  skin, usually just above your hip area.  The muscles over the top of your femur, where it meets your hip, will be  to show the top of your femur.  Your surgeon will use an instrument to make a tunnel through the center of your femur (bone marrow) to make room for the nail.  The nail will be placed into the femur.  An X-ray or a series of X-rays will be done to check the position of the nail as it goes into the bone.  A screw will be placed in the upper part of the femur to hold the nail in place. In some cases, a second screw may also be placed in the lower femur.  An X-ray will be done to make sure that the screw or screws are in the correct place.  The incision will be closed with stitches (sutures) or staples.  A bandage (dressing) will be placed over the incision.  The procedure may vary among surgeons and hospitals.  What happens after the surgery?  Your blood pressure, heart rate, breathing rate, and blood oxygen level will be monitored until you leave the hospital.  You may continue to receive fluids and medicines through an IV.  You will get medicine to control pain and medicines to reduce your risk for a blood clot or infection.  Your IV will be removed when you are able to eat and drink.  You may be taught to walk with crutches or a walker. You will be instructed about how much weight you can put on your leg.  You may start physical therapy exercises to prevent weakness and stiffness in your hip and knee.  Summary  Intramedullary nailing of a hip fracture is a surgery to repair a broken upper thigh bone (femur).  During this procedure, a metal sherry (nail) is placed through the center of your femur to hold the bone in place while it heals.  You may be taught how to walk with crutches or a walker. Your health care provider will let you know how much weight you can put on your leg.  You may start physical therapy exercises to prevent weakness and stiffness in your hip and knee.  This information is  not intended to replace advice given to you by your health care provider. Make sure you discuss any questions you have with your health care provider.  Document Released: 10/21/2019 Document Revised: 10/21/2019 Document Reviewed: 10/21/2019  Elsevier Patient Education © 2020 [employer]

## 2022-11-14 NOTE — CARE PLAN
Problem: Pain - Standard  Goal: Alleviation of pain or a reduction in pain to the patient’s comfort goal  Outcome: Progressing     Problem: Fall Risk  Goal: Patient will remain free from falls  Outcome: Progressing     Problem: Optimal Care for Alcohol Withdrawal  Goal: Optimal Care for the alcohol withdrawal patient  Outcome: Progressing   The patient is Stable - Low risk of patient condition declining or worsening    Shift Goals  Clinical Goals: Safety, CIWA, Mobility and pain control  Patient Goals: Mobility and pain control  Family Goals: N/A    Progress made toward(s) clinical / shift goals:      Patient is not progressing towards the following goals:n/a

## 2022-11-14 NOTE — PROGRESS NOTES
"  /73   Pulse 93   Temp 36.3 °C (97.3 °F) (Temporal)   Resp 16   Ht 1.676 m (5' 6\")   Wt 64.1 kg (141 lb 5 oz)   SpO2 97%     Recent Labs     11/12/22  0544 11/13/22  0350 11/14/22  0731   WBC 4.5* 3.2* 3.1*   RBC 2.67* 2.66* 2.79*   HEMOGLOBIN 10.0* 10.0* 10.5*   HEMATOCRIT 29.7* 30.1* 31.1*   .2* 113.2* 111.5*   MCH 37.5* 37.6* 37.6*   MCHC 33.7 33.2* 33.8   RDW 52.0* 54.0* 53.7*   PLATELETCT 52* 47* 48*   MPV 10.6 10.2 9.8       POD#4  S/P Hip nail    Plan:  DVT Prophylaxis- TEDS/SCDs, Lovenox while in hospital, ASA 81 mg PO BID as outpatient  Weight Bearing Status-WBAT  PT/OT  Antibiotics: None  Case Coordination           "

## 2022-11-14 NOTE — DISCHARGE PLANNING
Case Management Discharge Planning    Admission Date: 11/9/2022  GMLOS: 3.2  ALOS: 5    6-Clicks ADL Score: 22  6-Clicks Mobility Score: 18      Anticipated Discharge Dispo: Discharge Disposition: D/T to home under [facility] care in anticipation of covered skilled care (06)    DME Needed: No    Action(s) Taken: Updated Provider/Nurse on Discharge Plan    Escalations Completed: None    Medically Clear: No    Next Steps: Called [facility] , office currently closed. Message with contact for [facility]. Called and spoke to Gretta, the only services they provide are meals and transport. She states that there is a notice on their board with care-giver, Sarah Gutierrez  [phone]. Called Sarah and left message requesting return call.    Barriers to Discharge: Medical clearance    Is the patient up for discharge tomorrow: No

## 2022-11-14 NOTE — DISCHARGE PLANNING
Provided pt with contact for care-giver, Sarah Gutierrez . Pt states she knows another care-giver that provides care to other residents at her apt complex. Pt's spouse is currently in the hospital in [place].

## (undated) DEVICE — GOWN WARMING STANDARD FLEX - (30/CA)

## (undated) DEVICE — COVER LIGHT HANDLE ALC PLUS DISP (18EA/BX)

## (undated) DEVICE — PACK MAJOR ORTHO - (2EA/CA)

## (undated) DEVICE — TUBING CLEARLINK DUO-VENT - C-FLO (48EA/CA)

## (undated) DEVICE — SUCTION INSTRUMENT YANKAUER OPEN TIP W/O VENT (50EA/CA)

## (undated) DEVICE — TOWELS CLOTH SURGICAL - (4/PK 20PK/CA)

## (undated) DEVICE — LACTATED RINGERS INJ 1000 ML - (14EA/CA 60CA/PF)

## (undated) DEVICE — SENSOR OXIMETER ADULT SPO2 RD SET (20EA/BX)

## (undated) DEVICE — DRAPE 36X28IN RAD CARM BND BG - (25/CA) O

## (undated) DEVICE — SET LEADWIRE 5 LEAD BEDSIDE DISPOSABLE ECG (1SET OF 5/EA)

## (undated) DEVICE — SUTURE GENERAL

## (undated) DEVICE — DRESSING TRANSPARENT FILM TEGADERM 4 X 4.75" (50EA/BX)"

## (undated) DEVICE — SLEEVE, VASO, THIGH, MED

## (undated) DEVICE — BAG SPONGE COUNT 10.25 X 32 - BLUE (250/CA)

## (undated) DEVICE — SUTURE 2-0 VICRYL PLUS CT-1 - 8 X 18 INCH(12/BX)

## (undated) DEVICE — GLOVE BIOGEL SZ 7.5 SURGICAL PF LTX - (50PR/BX 4BX/CA)

## (undated) DEVICE — STAPLER SKIN DISP - (6/BX 10BX/CA) VISISTAT

## (undated) DEVICE — SUCTION INSTRUMENT YANKAUER BULBOUS TIP W/O VENT (50EA/CA)

## (undated) DEVICE — CANISTER SUCTION 3000ML MECHANICAL FILTER AUTO SHUTOFF MEDI-VAC NONSTERILE LF DISP  (40EA/CA)

## (undated) DEVICE — SODIUM CHL IRRIGATION 0.9% 1000ML (12EA/CA)

## (undated) DEVICE — SET EXTENSION WITH 2 PORTS (48EA/CA) ***PART #2C8610 IS A SUBSTITUTE*****

## (undated) DEVICE — GLOVE BIOGEL INDICATOR SZ 8 SURGICAL PF LTX - (50/BX 4BX/CA)

## (undated) DEVICE — DRAPE LARGE 3 QUARTER - (20/CA)

## (undated) DEVICE — PENCIL ELECTSURG 10FT BTN SWH - (50/CA)